# Patient Record
Sex: FEMALE | Race: BLACK OR AFRICAN AMERICAN | NOT HISPANIC OR LATINO | URBAN - METROPOLITAN AREA
[De-identification: names, ages, dates, MRNs, and addresses within clinical notes are randomized per-mention and may not be internally consistent; named-entity substitution may affect disease eponyms.]

---

## 2020-06-15 ENCOUNTER — OFFICE VISIT (OUTPATIENT)
Dept: URBAN - METROPOLITAN AREA TELEHEALTH 2 | Facility: TELEHEALTH | Age: 70
End: 2020-06-15
Payer: MEDICARE

## 2020-06-15 ENCOUNTER — LAB OUTSIDE AN ENCOUNTER (OUTPATIENT)
Dept: URBAN - METROPOLITAN AREA TELEHEALTH 2 | Facility: TELEHEALTH | Age: 70
End: 2020-06-15

## 2020-06-15 DIAGNOSIS — R19.7 DIARRHEA: ICD-10-CM

## 2020-06-15 DIAGNOSIS — B96.81 HELICOBACTER PYLORI INFECTION: ICD-10-CM

## 2020-06-15 DIAGNOSIS — R11.0 NAUSEA: ICD-10-CM

## 2020-06-15 DIAGNOSIS — K21.9 GERD (GASTROESOPHAGEAL REFLUX DISEASE): ICD-10-CM

## 2020-06-15 DIAGNOSIS — K92.1 RECTAL BLEEDING: ICD-10-CM

## 2020-06-15 DIAGNOSIS — K59.00 CONSTIPATION: ICD-10-CM

## 2020-06-15 DIAGNOSIS — K29.70 GASTRITIS: ICD-10-CM

## 2020-06-15 DIAGNOSIS — R05 COUGH: ICD-10-CM

## 2020-06-15 DIAGNOSIS — R10.13 EPIGASTRIC DISCOMFORT: ICD-10-CM

## 2020-06-15 PROCEDURE — 99443 PHONE E/M BY PHYS 21-30 MIN: CPT | Performed by: INTERNAL MEDICINE

## 2020-06-15 RX ORDER — SODIUM, POTASSIUM,MAG SULFATES 17.5-3.13G
17.5-13.3-1.6 GM/177ML SOLUTION, RECONSTITUTED, ORAL ORAL
Qty: 177 MILLILITER | Refills: 0 | OUTPATIENT
Start: 2020-06-15 | End: 2020-06-16

## 2020-06-15 RX ORDER — AMITRIPTYLINE HYDROCHLORIDE 10 MG/1
TAKE 1 TABLET BY MOUTH EVERYDAY AT BEDTIME TABLET, FILM COATED ORAL
Qty: 90 | Refills: 3 | Status: ACTIVE | COMMUNITY
Start: 2020-03-10 | End: 2021-03-05

## 2020-06-15 RX ORDER — LEUCOVORIN CALCIUM 10 MG/1
TAKE ONE DAILY TABLET ORAL
Qty: 0 | Refills: 0 | Status: ACTIVE | COMMUNITY
Start: 1900-01-01

## 2020-06-15 RX ORDER — LOSARTAN POTASSIUM AND HYDROCHLOROTHIAZIDE 100; 12.5 MG/1; MG/1
TAKE 1 TABLET BY ORAL ROUTE ONCE DAILY TABLET, FILM COATED ORAL 1
Qty: 0 | Refills: 0 | Status: ACTIVE | COMMUNITY
Start: 1900-01-01

## 2020-06-15 RX ORDER — LEVOTHYROXINE SODIUM 25 UG/1
TABLET ORAL
Qty: 0 | Refills: 0 | Status: ACTIVE | COMMUNITY
Start: 1900-01-01

## 2020-06-15 RX ORDER — PANTOPRAZOLE SODIUM 40 MG/1
TAKE 1 TABLET BY MOUTH EVERY DAY TABLET, DELAYED RELEASE ORAL
Qty: 90 | Refills: 3 | Status: ACTIVE | COMMUNITY
Start: 2020-03-09 | End: 2021-03-04

## 2020-06-15 RX ORDER — PREDNISONE 5 MG/1
TABLET ORAL
Qty: 0 | Refills: 0 | Status: ACTIVE | COMMUNITY
Start: 1900-01-01

## 2020-06-15 NOTE — HPI-TODAY'S VISIT:
Attempts were made to use real-time two-way video technology for today's encounter. Due to a technological failure or access issues, telephone technology was used in lieu of an office visit due to the current COVID-19 pandemic crisis and need for social isolation. Patient seen today via telehealth by agreement and consent of patient in light of current COVID-19 pandemic. I used a telephone call during the visit. The patient encounter is appropriate and reasonable under the circumstances given the patient's particular presentation at this time. The patient has been advised of the followin) the potential risks and limitations of this mode of treatment (including but not limited to the absence of in-person examination); 2) the right to refuse telehealth services at any point without affecting the right to future care; 3) the right to receive in-person services, included immediately after this consultation if an urgent need arises; 4) information, including identifiable images or information from this telehealth consult, will only be shared in accordance with HIPAA regulations. Any and all of the patient's and/or patient's family member's questions on this issue have been answered. The patient has verbally consented to be treated via telehealth services. The patient has also been advised to contact this office for worsening conditions or problems, and seek emergency medical treatment and/or call 911 if the patient deems either necessary. Patient presents with complaints of rectal bleeding for the last 2 weeks.  She states that this is bright red blood that is both mixed with the stool and noted on the tissue.  On rare occasion she may pass blood along.  She denies rectal pain, burning or itching.  She is not aware of hemorrhoids.  She states that she will develop crampy abdominal pain after meals that lead to loose watery stools.  There is blood mixed in with the stool.  If the cramping is severe enough she may just pass blood alone.  She has no nocturnal symptoms.  She has not lost any weight. She has had no evaluation for this issue.  There is no family history of colon cancer or colon polyps.  She is not on blood thinners. She had a negative colonoscopy in 2016.

## 2020-06-17 ENCOUNTER — TELEPHONE ENCOUNTER (OUTPATIENT)
Dept: URBAN - NONMETROPOLITAN AREA CLINIC 2 | Facility: CLINIC | Age: 70
End: 2020-06-17

## 2020-06-19 ENCOUNTER — OFFICE VISIT (OUTPATIENT)
Dept: URBAN - METROPOLITAN AREA TELEHEALTH 2 | Facility: TELEHEALTH | Age: 70
End: 2020-06-19

## 2020-06-25 LAB
HEMATOCRIT: 37.5
HEMOGLOBIN: 12.1
Lab: (no result)
MCH: 28.8
MCHC: 32.3
MCV: 89
NRBC: (no result)
PLATELETS: 232
RBC: 4.2
RDW: 13.3
WBC: 7

## 2020-06-29 ENCOUNTER — TELEPHONE ENCOUNTER (OUTPATIENT)
Dept: URBAN - NONMETROPOLITAN AREA CLINIC 2 | Facility: CLINIC | Age: 70
End: 2020-06-29

## 2020-06-29 RX ORDER — LEVOTHYROXINE SODIUM 25 UG/1
TABLET ORAL
Qty: 0 | Refills: 0 | Status: ACTIVE | COMMUNITY
Start: 1900-01-01

## 2020-06-29 RX ORDER — PANTOPRAZOLE SODIUM 40 MG/1
TAKE 1 TABLET BY MOUTH EVERY DAY TABLET, DELAYED RELEASE ORAL
Qty: 90 | Refills: 3 | Status: ACTIVE | COMMUNITY
Start: 2020-03-09 | End: 2021-03-04

## 2020-06-29 RX ORDER — AMITRIPTYLINE HYDROCHLORIDE 10 MG/1
TAKE 1 TABLET BY MOUTH EVERYDAY AT BEDTIME TABLET, FILM COATED ORAL
Qty: 90 | Refills: 3 | Status: ACTIVE | COMMUNITY
Start: 2020-03-10 | End: 2021-03-05

## 2020-06-29 RX ORDER — LOSARTAN POTASSIUM AND HYDROCHLOROTHIAZIDE 100; 12.5 MG/1; MG/1
TAKE 1 TABLET BY ORAL ROUTE ONCE DAILY TABLET, FILM COATED ORAL 1
Qty: 0 | Refills: 0 | Status: ACTIVE | COMMUNITY
Start: 1900-01-01

## 2020-06-29 RX ORDER — LEUCOVORIN CALCIUM 10 MG/1
TAKE ONE DAILY TABLET ORAL
Qty: 0 | Refills: 0 | Status: ACTIVE | COMMUNITY
Start: 1900-01-01

## 2020-06-29 RX ORDER — PREDNISONE 5 MG/1
TABLET ORAL
Qty: 0 | Refills: 0 | Status: ACTIVE | COMMUNITY
Start: 1900-01-01

## 2020-06-29 RX ORDER — SODIUM, POTASSIUM,MAG SULFATES 17.5-3.13G
177ML SOLUTION, RECONSTITUTED, ORAL ORAL
Qty: 1 KIT | Refills: 0 | OUTPATIENT

## 2020-07-15 ENCOUNTER — CLAIMS CREATED FROM THE CLAIM WINDOW (OUTPATIENT)
Dept: URBAN - METROPOLITAN AREA CLINIC 4 | Facility: CLINIC | Age: 70
End: 2020-07-15
Payer: MEDICARE

## 2020-07-15 ENCOUNTER — OFFICE VISIT (OUTPATIENT)
Dept: URBAN - NONMETROPOLITAN AREA SURGERY CENTER 1 | Facility: SURGERY CENTER | Age: 70
End: 2020-07-15
Payer: MEDICARE

## 2020-07-15 DIAGNOSIS — K52.9 NONINFECTIVE GASTROENTERITIS AND COLITIS, UNSPECIFIED: ICD-10-CM

## 2020-07-15 DIAGNOSIS — K52.89 (LYMPHOCYTIC) MICROSCOPIC COLITIS: ICD-10-CM

## 2020-07-15 DIAGNOSIS — K62.5 ANAL BLEEDING: ICD-10-CM

## 2020-07-15 PROCEDURE — G9937 DIG OR SURV COLSCO: HCPCS | Performed by: INTERNAL MEDICINE

## 2020-07-15 PROCEDURE — G8907 PT DOC NO EVENTS ON DISCHARG: HCPCS | Performed by: INTERNAL MEDICINE

## 2020-07-15 PROCEDURE — 88305 TISSUE EXAM BY PATHOLOGIST: CPT | Performed by: PATHOLOGY

## 2020-07-15 PROCEDURE — 45380 COLONOSCOPY AND BIOPSY: CPT | Performed by: INTERNAL MEDICINE

## 2020-07-15 RX ORDER — LEUCOVORIN CALCIUM 10 MG/1
TAKE ONE DAILY TABLET ORAL
Qty: 0 | Refills: 0 | Status: ACTIVE | COMMUNITY
Start: 1900-01-01

## 2020-07-15 RX ORDER — LEVOTHYROXINE SODIUM 25 UG/1
TABLET ORAL
Qty: 0 | Refills: 0 | Status: ACTIVE | COMMUNITY
Start: 1900-01-01

## 2020-07-15 RX ORDER — AMITRIPTYLINE HYDROCHLORIDE 10 MG/1
TAKE 1 TABLET BY MOUTH EVERYDAY AT BEDTIME TABLET, FILM COATED ORAL
Qty: 90 | Refills: 3 | Status: ACTIVE | COMMUNITY
Start: 2020-03-10 | End: 2021-03-05

## 2020-07-15 RX ORDER — PANTOPRAZOLE SODIUM 40 MG/1
TAKE 1 TABLET BY MOUTH EVERY DAY TABLET, DELAYED RELEASE ORAL
Qty: 90 | Refills: 3 | Status: ACTIVE | COMMUNITY
Start: 2020-03-09 | End: 2021-03-04

## 2020-07-15 RX ORDER — PREDNISONE 5 MG/1
TABLET ORAL
Qty: 0 | Refills: 0 | Status: ACTIVE | COMMUNITY
Start: 1900-01-01

## 2020-07-15 RX ORDER — LOSARTAN POTASSIUM AND HYDROCHLOROTHIAZIDE 100; 12.5 MG/1; MG/1
TAKE 1 TABLET BY ORAL ROUTE ONCE DAILY TABLET, FILM COATED ORAL 1
Qty: 0 | Refills: 0 | Status: ACTIVE | COMMUNITY
Start: 1900-01-01

## 2020-07-15 RX ORDER — SODIUM, POTASSIUM,MAG SULFATES 17.5-3.13G
177ML SOLUTION, RECONSTITUTED, ORAL ORAL
Qty: 1 KIT | Refills: 0 | Status: ACTIVE | COMMUNITY

## 2020-07-27 ENCOUNTER — TELEPHONE ENCOUNTER (OUTPATIENT)
Dept: URBAN - METROPOLITAN AREA CLINIC 92 | Facility: CLINIC | Age: 70
End: 2020-07-27

## 2020-07-27 ENCOUNTER — LAB OUTSIDE AN ENCOUNTER (OUTPATIENT)
Dept: URBAN - METROPOLITAN AREA CLINIC 92 | Facility: CLINIC | Age: 70
End: 2020-07-27

## 2020-07-27 RX ORDER — PREDNISONE 5 MG/1
TABLET ORAL
Qty: 0 | Refills: 0 | Status: ACTIVE | COMMUNITY
Start: 1900-01-01

## 2020-07-27 RX ORDER — METRONIDAZOLE 250 MG/1
1 TABLET TABLET, FILM COATED ORAL THREE TIMES A DAY
Qty: 30 TABLET | Refills: 5 | OUTPATIENT
Start: 2020-07-27 | End: 2020-09-25

## 2020-07-27 RX ORDER — LEVOTHYROXINE SODIUM 25 UG/1
TABLET ORAL
Qty: 0 | Refills: 0 | Status: ACTIVE | COMMUNITY
Start: 1900-01-01

## 2020-07-27 RX ORDER — AMITRIPTYLINE HYDROCHLORIDE 10 MG/1
TAKE 1 TABLET BY MOUTH EVERYDAY AT BEDTIME TABLET, FILM COATED ORAL
Qty: 90 | Refills: 3 | Status: ACTIVE | COMMUNITY
Start: 2020-03-10 | End: 2021-03-05

## 2020-07-27 RX ORDER — LOSARTAN POTASSIUM AND HYDROCHLOROTHIAZIDE 100; 12.5 MG/1; MG/1
TAKE 1 TABLET BY ORAL ROUTE ONCE DAILY TABLET, FILM COATED ORAL 1
Qty: 0 | Refills: 0 | Status: ACTIVE | COMMUNITY
Start: 1900-01-01

## 2020-07-27 RX ORDER — PANTOPRAZOLE SODIUM 40 MG/1
TAKE 1 TABLET BY MOUTH EVERY DAY TABLET, DELAYED RELEASE ORAL
Qty: 90 | Refills: 3 | Status: ACTIVE | COMMUNITY
Start: 2020-03-09 | End: 2021-03-04

## 2020-07-27 RX ORDER — LEUCOVORIN CALCIUM 10 MG/1
TAKE ONE DAILY TABLET ORAL
Qty: 0 | Refills: 0 | Status: ACTIVE | COMMUNITY
Start: 1900-01-01

## 2020-07-27 RX ORDER — SODIUM, POTASSIUM,MAG SULFATES 17.5-3.13G
177ML SOLUTION, RECONSTITUTED, ORAL ORAL
Qty: 1 KIT | Refills: 0 | Status: ACTIVE | COMMUNITY

## 2020-07-29 LAB
C-REACTIVE PROTEIN, QUANT: 2
HEMATOCRIT: 34.3
HEMOGLOBIN: 11.2
MCH: 28.8
MCHC: 32.7
MCV: 88
NRBC: (no result)
PLATELETS: 206
RBC: 3.89
RDW: 13.1
WBC: 5.7

## 2020-07-31 LAB — OCCULT BLOOD, FECAL, IA: POSITIVE

## 2020-08-07 ENCOUNTER — WEB ENCOUNTER (OUTPATIENT)
Dept: URBAN - NONMETROPOLITAN AREA CLINIC 2 | Facility: CLINIC | Age: 70
End: 2020-08-07

## 2020-08-07 ENCOUNTER — OFFICE VISIT (OUTPATIENT)
Dept: URBAN - NONMETROPOLITAN AREA CLINIC 2 | Facility: CLINIC | Age: 70
End: 2020-08-07
Payer: MEDICARE

## 2020-08-07 DIAGNOSIS — K29.70 GASTRITIS: ICD-10-CM

## 2020-08-07 DIAGNOSIS — K59.00 CONSTIPATION: ICD-10-CM

## 2020-08-07 DIAGNOSIS — K52.9 COLITIS: ICD-10-CM

## 2020-08-07 DIAGNOSIS — R10.13 EPIGASTRIC DISCOMFORT: ICD-10-CM

## 2020-08-07 DIAGNOSIS — B96.81 HELICOBACTER PYLORI INFECTION: ICD-10-CM

## 2020-08-07 DIAGNOSIS — R11.0 NAUSEA: ICD-10-CM

## 2020-08-07 DIAGNOSIS — R05 COUGH: ICD-10-CM

## 2020-08-07 DIAGNOSIS — K52.89 (LYMPHOCYTIC) MICROSCOPIC COLITIS: ICD-10-CM

## 2020-08-07 DIAGNOSIS — R19.7 DIARRHEA: ICD-10-CM

## 2020-08-07 DIAGNOSIS — K21.9 GERD (GASTROESOPHAGEAL REFLUX DISEASE): ICD-10-CM

## 2020-08-07 PROCEDURE — G8427 DOCREV CUR MEDS BY ELIG CLIN: HCPCS | Performed by: INTERNAL MEDICINE

## 2020-08-07 PROCEDURE — 99214 OFFICE O/P EST MOD 30 MIN: CPT | Performed by: INTERNAL MEDICINE

## 2020-08-07 PROCEDURE — 1036F TOBACCO NON-USER: CPT | Performed by: INTERNAL MEDICINE

## 2020-08-07 PROCEDURE — 3017F COLORECTAL CA SCREEN DOC REV: CPT | Performed by: INTERNAL MEDICINE

## 2020-08-07 PROCEDURE — G8417 CALC BMI ABV UP PARAM F/U: HCPCS | Performed by: INTERNAL MEDICINE

## 2020-08-07 PROCEDURE — G9903 PT SCRN TBCO ID AS NON USER: HCPCS | Performed by: INTERNAL MEDICINE

## 2020-08-07 RX ORDER — PREDNISONE 5 MG/1
TABLET ORAL
Qty: 0 | Refills: 0 | Status: ACTIVE | COMMUNITY
Start: 1900-01-01

## 2020-08-07 RX ORDER — SODIUM, POTASSIUM,MAG SULFATES 17.5-3.13G
177ML SOLUTION, RECONSTITUTED, ORAL ORAL
Qty: 1 KIT | Refills: 0 | Status: ACTIVE | COMMUNITY

## 2020-08-07 RX ORDER — PANTOPRAZOLE SODIUM 40 MG/1
TAKE 1 TABLET BY MOUTH EVERY DAY TABLET, DELAYED RELEASE ORAL
Qty: 90 | Refills: 3 | Status: ACTIVE | COMMUNITY
Start: 2020-03-09 | End: 2021-03-04

## 2020-08-07 RX ORDER — METRONIDAZOLE 250 MG/1
1 TABLET TABLET, FILM COATED ORAL THREE TIMES A DAY
Qty: 30 TABLET | Refills: 5 | Status: ACTIVE | COMMUNITY
Start: 2020-07-27 | End: 2020-09-25

## 2020-08-07 RX ORDER — SULFASALAZINE 500 MG/1
2 TABLETS TABLET ORAL TID
Qty: 180 TABLET | Refills: 3 | OUTPATIENT
Start: 2020-08-07 | End: 2020-12-04

## 2020-08-07 RX ORDER — LEVOTHYROXINE SODIUM 25 UG/1
TABLET ORAL
Qty: 0 | Refills: 0 | Status: ACTIVE | COMMUNITY
Start: 1900-01-01

## 2020-08-07 RX ORDER — LEUCOVORIN CALCIUM 10 MG/1
TAKE ONE DAILY TABLET ORAL
Qty: 0 | Refills: 0 | Status: ACTIVE | COMMUNITY
Start: 1900-01-01

## 2020-08-07 RX ORDER — AMITRIPTYLINE HYDROCHLORIDE 10 MG/1
TAKE 1 TABLET BY MOUTH EVERYDAY AT BEDTIME TABLET, FILM COATED ORAL
Qty: 90 | Refills: 3 | Status: ACTIVE | COMMUNITY
Start: 2020-03-10 | End: 2021-03-05

## 2020-08-07 RX ORDER — LOSARTAN POTASSIUM AND HYDROCHLOROTHIAZIDE 100; 12.5 MG/1; MG/1
TAKE 1 TABLET BY ORAL ROUTE ONCE DAILY TABLET, FILM COATED ORAL 1
Qty: 0 | Refills: 0 | Status: ACTIVE | COMMUNITY
Start: 1900-01-01

## 2020-08-07 NOTE — PHYSICAL EXAM GASTROINTESTINAL
Abdomen , soft, nontender, nondistended , no guarding or rigidity , no masses palpable , normal bowel sounds , Liver and Spleen , no hepatomegaly present , no hepatosplenomegaly , liver nontender , spleen not palpable , Self Exam: Abdomen soft, non-tender to palpatation, non-distended

## 2020-08-07 NOTE — HPI-TODAY'S VISIT:
Pt comes in for follow up of possible colitis. There was segmental erythema in the sigmoid and microscopic inflammation throughout the rest of the colon. She has normal BMs and no bleeding currently. Although she did have rectal bleeding that prompted the colonoscopy. She has no history of IBD.  She is currently on a Pred taper. Recent Hemosure is +, Hgb is down slightly but CRP is normal.

## 2020-08-07 NOTE — PHYSICAL EXAM CHEST:
no lesions,  no deformities,  no traumatic injuries,  no significant scars are present,  chest wall non-tender,  no masses present,  tactile fremitus is normal, breathing is unlabored without accessory muscle use, normal breath sounds , breathing is unlabored without accessory muscle use.

## 2020-08-07 NOTE — PHYSICAL EXAM NECK/THYROID:
normal appearance , without tenderness upon palpation , no deformities , trachea midline , Thyroid normal size , no thyroid nodules , no masses , no JVD , thyroid nontender , normal appearance , no deformities

## 2020-08-13 ENCOUNTER — TELEPHONE ENCOUNTER (OUTPATIENT)
Dept: URBAN - NONMETROPOLITAN AREA CLINIC 2 | Facility: CLINIC | Age: 70
End: 2020-08-13

## 2020-08-13 RX ORDER — BALSALAZIDE DISODIUM 750 MG/1
3 PO BID CAPSULE ORAL TWICE A DAY
Qty: 540 CAPSULES | Refills: 0 | OUTPATIENT

## 2020-08-13 RX ORDER — SULFASALAZINE 500 MG/1
2 TABLETS TABLET ORAL TID
OUTPATIENT
Start: 2020-08-07 | End: 2020-12-04

## 2020-08-17 ENCOUNTER — TELEPHONE ENCOUNTER (OUTPATIENT)
Dept: URBAN - NONMETROPOLITAN AREA CLINIC 1 | Facility: CLINIC | Age: 70
End: 2020-08-17

## 2020-08-23 ENCOUNTER — ERX REFILL RESPONSE (OUTPATIENT)
Dept: URBAN - METROPOLITAN AREA CLINIC 92 | Facility: CLINIC | Age: 70
End: 2020-08-23

## 2020-08-28 ENCOUNTER — ERX REFILL RESPONSE (OUTPATIENT)
Age: 70
End: 2020-08-28

## 2020-08-28 RX ORDER — PREDNISONE 20 MG/1
PLEASE SEE ATTACHED FOR DETAILED DIRECTIONS TABLET ORAL
Qty: 35 | Refills: 0

## 2020-09-22 ENCOUNTER — OFFICE VISIT (OUTPATIENT)
Dept: URBAN - NONMETROPOLITAN AREA CLINIC 2 | Facility: CLINIC | Age: 70
End: 2020-09-22
Payer: MEDICARE

## 2020-09-22 DIAGNOSIS — K52.9 COLITIS: ICD-10-CM

## 2020-09-22 PROCEDURE — G8427 DOCREV CUR MEDS BY ELIG CLIN: HCPCS | Performed by: NURSE PRACTITIONER

## 2020-09-22 PROCEDURE — 1036F TOBACCO NON-USER: CPT | Performed by: NURSE PRACTITIONER

## 2020-09-22 PROCEDURE — 3017F COLORECTAL CA SCREEN DOC REV: CPT | Performed by: NURSE PRACTITIONER

## 2020-09-22 PROCEDURE — 99213 OFFICE O/P EST LOW 20 MIN: CPT | Performed by: NURSE PRACTITIONER

## 2020-09-22 PROCEDURE — G8417 CALC BMI ABV UP PARAM F/U: HCPCS | Performed by: NURSE PRACTITIONER

## 2020-09-22 PROCEDURE — 82274 ASSAY TEST FOR BLOOD FECAL: CPT | Performed by: NURSE PRACTITIONER

## 2020-09-22 RX ORDER — AMITRIPTYLINE HYDROCHLORIDE 10 MG/1
TAKE 1 TABLET BY MOUTH EVERYDAY AT BEDTIME TABLET, FILM COATED ORAL
Qty: 90 | Refills: 3 | Status: ACTIVE | COMMUNITY
Start: 2020-03-10 | End: 2021-03-05

## 2020-09-22 RX ORDER — HYOSCYAMINE SULFATE 0.38 MG/1
1 TABLET TABLET ORAL
Status: ACTIVE | COMMUNITY

## 2020-09-22 RX ORDER — PANTOPRAZOLE SODIUM 40 MG/1
TAKE 1 TABLET BY MOUTH EVERY DAY TABLET, DELAYED RELEASE ORAL
Qty: 90 | Refills: 3 | Status: ACTIVE | COMMUNITY
Start: 2020-03-09 | End: 2021-03-04

## 2020-09-22 RX ORDER — LEVOTHYROXINE SODIUM 25 UG/1
TABLET ORAL
Qty: 0 | Refills: 0 | Status: ACTIVE | COMMUNITY
Start: 1900-01-01

## 2020-09-22 RX ORDER — LOSARTAN POTASSIUM AND HYDROCHLOROTHIAZIDE 100; 12.5 MG/1; MG/1
TAKE 1 TABLET BY ORAL ROUTE ONCE DAILY TABLET, FILM COATED ORAL 1
Qty: 0 | Refills: 0 | Status: ACTIVE | COMMUNITY
Start: 1900-01-01

## 2020-09-22 NOTE — HPI-TODAY'S VISIT:
Patient presents for follow-up for nonspecific colitis.  Noted be mildly anemic in July with hemoglobin of 11.2 and normal MCV.  She completed prednisone taper and is on Azulfidine 2 tablets twice daily.  She could not tolerate 3 tablets twice daily due to fatigue.  She has had no further rectal bleeding.  She continues to have 3-4 stools in the mornings and 1 in the afternoon/evening.  She continues with lower abdominal cramping. TG

## 2020-09-23 LAB
FERRITIN, SERUM: 261
HEMATOCRIT: 37.5
HEMOGLOBIN: 12.3
IRON: 87
MCH: 29.4
MCHC: 32.8
MCV: 90
NRBC: (no result)
PLATELETS: 199
RBC: 4.19
RDW: 13.4
WBC: 4.6

## 2020-10-05 ENCOUNTER — TELEPHONE ENCOUNTER (OUTPATIENT)
Dept: URBAN - METROPOLITAN AREA CLINIC 92 | Facility: CLINIC | Age: 70
End: 2020-10-05

## 2020-10-05 LAB
CALPROTECTIN, FECAL: 878
OCCULT BLOOD, FECAL, IA: POSITIVE

## 2020-10-05 RX ORDER — AMITRIPTYLINE HYDROCHLORIDE 10 MG/1
TAKE 1 TABLET BY MOUTH EVERYDAY AT BEDTIME TABLET, FILM COATED ORAL
Qty: 90 | Refills: 3 | Status: ACTIVE | COMMUNITY
Start: 2020-03-10 | End: 2021-03-05

## 2020-10-05 RX ORDER — PREDNISONE 10 MG/1
4 TABLETS X 7 DAYS, THEN 3 TABLETS X 7 DAYS, THEN 2 TABLETS X 7 DAYS, THEN 1 TABLET X 7 DAYS TABLET ORAL ONCE A DAY
Qty: 70 TABLETS | Refills: 0 | OUTPATIENT
Start: 2020-10-05 | End: 2020-11-01

## 2020-10-05 RX ORDER — HYOSCYAMINE SULFATE 0.38 MG/1
1 TABLET TABLET ORAL
Status: ACTIVE | COMMUNITY

## 2020-10-05 RX ORDER — LEVOTHYROXINE SODIUM 25 UG/1
TABLET ORAL
Qty: 0 | Refills: 0 | Status: ACTIVE | COMMUNITY
Start: 1900-01-01

## 2020-10-05 RX ORDER — PANTOPRAZOLE SODIUM 40 MG/1
TAKE 1 TABLET BY MOUTH EVERY DAY TABLET, DELAYED RELEASE ORAL
Qty: 90 | Refills: 3 | Status: ACTIVE | COMMUNITY
Start: 2020-03-09 | End: 2021-03-04

## 2020-10-05 RX ORDER — LOSARTAN POTASSIUM AND HYDROCHLOROTHIAZIDE 100; 12.5 MG/1; MG/1
TAKE 1 TABLET BY ORAL ROUTE ONCE DAILY TABLET, FILM COATED ORAL 1
Qty: 0 | Refills: 0 | Status: ACTIVE | COMMUNITY
Start: 1900-01-01

## 2020-11-03 ENCOUNTER — ERX REFILL RESPONSE (OUTPATIENT)
Age: 70
End: 2020-11-03

## 2020-11-03 RX ORDER — HYOSCYAMINE SULFATE 0.38 MG/1
TAKE 1 TABLET (0.375 MG) BY ORAL ROUTE EVERY 12 HOURS FOR 30 DAYS TABLET ORAL
Qty: 60 TABLETS | Refills: 5

## 2020-11-24 ENCOUNTER — OFFICE VISIT (OUTPATIENT)
Dept: URBAN - NONMETROPOLITAN AREA CLINIC 2 | Facility: CLINIC | Age: 70
End: 2020-11-24
Payer: MEDICARE

## 2020-11-24 DIAGNOSIS — K52.9 COLITIS: ICD-10-CM

## 2020-11-24 DIAGNOSIS — K51.90 EXACERBATION OF ULCERATIVE COLITIS WITHOUT COMPLICATION: ICD-10-CM

## 2020-11-24 PROCEDURE — 1036F TOBACCO NON-USER: CPT | Performed by: NURSE PRACTITIONER

## 2020-11-24 PROCEDURE — G8417 CALC BMI ABV UP PARAM F/U: HCPCS | Performed by: NURSE PRACTITIONER

## 2020-11-24 PROCEDURE — G8483 FLU IMM NO ADMIN DOC REA: HCPCS | Performed by: NURSE PRACTITIONER

## 2020-11-24 PROCEDURE — G8427 DOCREV CUR MEDS BY ELIG CLIN: HCPCS | Performed by: NURSE PRACTITIONER

## 2020-11-24 PROCEDURE — 3017F COLORECTAL CA SCREEN DOC REV: CPT | Performed by: NURSE PRACTITIONER

## 2020-11-24 PROCEDURE — 99213 OFFICE O/P EST LOW 20 MIN: CPT | Performed by: NURSE PRACTITIONER

## 2020-11-24 PROCEDURE — 82274 ASSAY TEST FOR BLOOD FECAL: CPT | Performed by: NURSE PRACTITIONER

## 2020-11-24 RX ORDER — HYOSCYAMINE SULFATE 0.38 MG/1
TAKE 1 TABLET (0.375 MG) BY ORAL ROUTE EVERY 12 HOURS FOR 30 DAYS TABLET ORAL
Qty: 60 TABLETS | Refills: 5 | Status: ACTIVE | COMMUNITY

## 2020-11-24 RX ORDER — PANTOPRAZOLE SODIUM 40 MG/1
TAKE 1 TABLET BY MOUTH EVERY DAY TABLET, DELAYED RELEASE ORAL
Qty: 90 | Refills: 3 | Status: ACTIVE | COMMUNITY
Start: 2020-03-09 | End: 2021-03-04

## 2020-11-24 RX ORDER — LEVOTHYROXINE SODIUM 25 UG/1
TABLET ORAL
Qty: 0 | Refills: 0 | Status: ACTIVE | COMMUNITY
Start: 1900-01-01

## 2020-11-24 RX ORDER — LOSARTAN POTASSIUM AND HYDROCHLOROTHIAZIDE 100; 12.5 MG/1; MG/1
TAKE 1 TABLET BY ORAL ROUTE ONCE DAILY TABLET, FILM COATED ORAL 1
Qty: 0 | Refills: 0 | Status: ACTIVE | COMMUNITY
Start: 1900-01-01

## 2020-11-24 RX ORDER — AMITRIPTYLINE HYDROCHLORIDE 10 MG/1
TAKE 1 TABLET BY MOUTH EVERYDAY AT BEDTIME TABLET, FILM COATED ORAL
Qty: 90 | Refills: 3 | Status: ACTIVE | COMMUNITY
Start: 2020-03-10 | End: 2021-03-05

## 2020-11-24 NOTE — HPI-TODAY'S VISIT:
9/22/20 Patient presents for follow-up for nonspecific colitis.  Noted be mildly anemic in July with hemoglobin of 11.2 and normal MCV.  She completed prednisone taper and is on Azulfidine 2 tablets twice daily.  She could not tolerate 3 tablets twice daily due to fatigue.  She has had no further rectal bleeding.  She continues to have 3-4 stools in the mornings and 1 in the afternoon/evening.  She continues with lower abdominal cramping. TG  11/24/20 Ms. Umm Espitia presents for follow up for nonspecific colitis, noted to have segmental colitis of the rectosigmoid on colonoscopy 7/15/20, path with focal active colitis that was nonspecific. She did improve with the prednisone taper over the summer but had recurrent diarrhea at her last follow up. Fecal melissa at that visit was elevaetd at 878 and stool was occult positive. We treated with another steroid taper and continued on maintenance therapy. Today she reports loose stoolsl have resolved. She did see a bit of blood last week  but this was on the toilet tissue and only with one stool. O/w she is improved. No abdominal pain, nausea, vomiting, weight loss, changes in appetite, rash, itching, visual changes, etc.  TG

## 2020-11-25 LAB
BASO (ABSOLUTE): 0
BASOS: 0
EOS (ABSOLUTE): 0
EOS: 1
HEMATOCRIT: 37
HEMATOLOGY COMMENTS:: (no result)
HEMOGLOBIN: 12.1
IMMATURE CELLS: (no result)
IMMATURE GRANS (ABS): 0
IMMATURE GRANULOCYTES: 0
LYMPHS (ABSOLUTE): 3
LYMPHS: 61
MCH: 28.9
MCHC: 32.7
MCV: 89
MONOCYTES(ABSOLUTE): 0.5
MONOCYTES: 10
NEUTROPHILS (ABSOLUTE): 1.4
NEUTROPHILS: 28
NRBC: (no result)
PLATELETS: 248
RBC: 4.18
RDW: 13.4
WBC: 5

## 2020-12-02 LAB
CALPROTECTIN, FECAL: 116
OCCULT BLOOD, FECAL, IA: NEGATIVE

## 2021-01-22 ENCOUNTER — OFFICE VISIT (OUTPATIENT)
Dept: URBAN - NONMETROPOLITAN AREA CLINIC 2 | Facility: CLINIC | Age: 71
End: 2021-01-22

## 2021-01-26 ENCOUNTER — OFFICE VISIT (OUTPATIENT)
Dept: URBAN - NONMETROPOLITAN AREA CLINIC 2 | Facility: CLINIC | Age: 71
End: 2021-01-26
Payer: MEDICARE

## 2021-01-26 DIAGNOSIS — K51.80 OTHER ULCERATIVE COLITIS WITHOUT COMPLICATION: ICD-10-CM

## 2021-01-26 DIAGNOSIS — Z12.11 COLON CANCER SCREENING: ICD-10-CM

## 2021-01-26 DIAGNOSIS — K21.9 GERD (GASTROESOPHAGEAL REFLUX DISEASE): ICD-10-CM

## 2021-01-26 PROCEDURE — 3017F COLORECTAL CA SCREEN DOC REV: CPT | Performed by: NURSE PRACTITIONER

## 2021-01-26 PROCEDURE — G8427 DOCREV CUR MEDS BY ELIG CLIN: HCPCS | Performed by: NURSE PRACTITIONER

## 2021-01-26 PROCEDURE — G8417 CALC BMI ABV UP PARAM F/U: HCPCS | Performed by: NURSE PRACTITIONER

## 2021-01-26 PROCEDURE — 1036F TOBACCO NON-USER: CPT | Performed by: NURSE PRACTITIONER

## 2021-01-26 PROCEDURE — 99213 OFFICE O/P EST LOW 20 MIN: CPT | Performed by: NURSE PRACTITIONER

## 2021-01-26 PROCEDURE — G8482 FLU IMMUNIZE ORDER/ADMIN: HCPCS | Performed by: NURSE PRACTITIONER

## 2021-01-26 RX ORDER — HYOSCYAMINE SULFATE 0.38 MG/1
TAKE 1 TABLET (0.375 MG) BY ORAL ROUTE EVERY 12 HOURS FOR 30 DAYS TABLET ORAL
Qty: 60 TABLETS | Refills: 5 | Status: ACTIVE | COMMUNITY

## 2021-01-26 RX ORDER — LEVOTHYROXINE SODIUM 25 UG/1
TABLET ORAL
Qty: 0 | Refills: 0 | Status: ACTIVE | COMMUNITY
Start: 1900-01-01

## 2021-01-26 RX ORDER — AMITRIPTYLINE HYDROCHLORIDE 10 MG/1
TAKE 1 TABLET BY MOUTH EVERYDAY AT BEDTIME TABLET, FILM COATED ORAL
Qty: 90 | Refills: 3 | Status: ACTIVE | COMMUNITY
Start: 2020-03-10 | End: 2021-03-05

## 2021-01-26 RX ORDER — PANTOPRAZOLE SODIUM 40 MG/1
TAKE 1 TABLET BY MOUTH EVERY DAY TABLET, DELAYED RELEASE ORAL
Qty: 90 | Refills: 3 | Status: ACTIVE | COMMUNITY
Start: 2020-03-09 | End: 2021-03-04

## 2021-01-26 RX ORDER — BALSALAZIDE DISODIUM 750 MG/1
3 PO BID CAPSULE ORAL TWICE A DAY
Qty: 540 CAPSULES | Refills: 0 | Status: ACTIVE | COMMUNITY

## 2021-01-26 RX ORDER — LOSARTAN POTASSIUM AND HYDROCHLOROTHIAZIDE 100; 12.5 MG/1; MG/1
TAKE 1 TABLET BY ORAL ROUTE ONCE DAILY TABLET, FILM COATED ORAL 1
Qty: 0 | Refills: 0 | Status: ACTIVE | COMMUNITY
Start: 1900-01-01

## 2021-01-26 NOTE — HPI-TODAY'S VISIT:
1/26/21  Umm Espitia presents for follow up for for presumed UC of the rectosigmoid. She was mildly anemic in July and colonoscopy showed nonspecific colitis. She completed prednisone taper and started azulfidine but had recurrent diarrhea at follow up and fecal calprotectin was elevated at 878 with occult positive stool. We treated with another steroid taper and to continue maintenance medication (balsalazide). At follow up fecal calprotectin was normal and anemia had resolved with heme negative stool and normal bowel movements. Today she continues to do well. She reports 1 stool most mornings but will have 2-3 loose stools a few days a week. Stools are better if she leaves the fiber off. She has also stopped the Levsin. Overall, she feels well. No EIM.  TG  Endoscopy Colonoscopy 7/2020 with congested mucosa in the recto-sigmoid colon, path with questionable colitis, nonspecific.  EGD 1/29/20 normal EGD; gastric bx negative for H. pylori, esophageal bx c/w lymphocytic esophagitis (reflux related). Colonoscopy 12/2016-normal colon; single ulcer in the distal ileum with bx focally ulcerated TI, no granulomas.

## 2021-01-27 LAB
A/G RATIO: 1.4
ALBUMIN: 4.3
ALKALINE PHOSPHATASE: 48
ALT (SGPT): 18
AST (SGOT): 21
BASO (ABSOLUTE): 0
BASOS: 0
BILIRUBIN, TOTAL: 0.2
BUN/CREATININE RATIO: 15
BUN: 13
CALCIUM: 9.7
CARBON DIOXIDE, TOTAL: 27
CHLORIDE: 101
CREATININE: 0.88
EGFR IF AFRICN AM: 76
EGFR IF NONAFRICN AM: 66
EOS (ABSOLUTE): 0
EOS: 0
GLOBULIN, TOTAL: 3
GLUCOSE: 105
HEMATOCRIT: 41.8
HEMATOLOGY COMMENTS:: (no result)
HEMOGLOBIN: 13.5
IMMATURE CELLS: (no result)
IMMATURE GRANS (ABS): 0
IMMATURE GRANULOCYTES: 0
LYMPHS (ABSOLUTE): 2.1
LYMPHS: 44
MCH: 29.5
MCHC: 32.3
MCV: 92
MONOCYTES(ABSOLUTE): 0.3
MONOCYTES: 7
NEUTROPHILS (ABSOLUTE): 2.3
NEUTROPHILS: 49
NRBC: (no result)
PLATELETS: 229
POTASSIUM: 4.2
PROTEIN, TOTAL: 7.3
RBC: 4.57
RDW: 13.2
SODIUM: 142
WBC: 4.7

## 2021-01-29 LAB
FATS, NEUTRAL: NORMAL
FATS, TOTAL: NORMAL
OCCULT BLOOD, FECAL, IA: POSITIVE

## 2021-04-06 ENCOUNTER — TELEPHONE ENCOUNTER (OUTPATIENT)
Dept: URBAN - NONMETROPOLITAN AREA CLINIC 1 | Facility: CLINIC | Age: 71
End: 2021-04-06

## 2021-04-27 ENCOUNTER — ERX REFILL RESPONSE (OUTPATIENT)
Dept: URBAN - NONMETROPOLITAN AREA CLINIC 2 | Facility: CLINIC | Age: 71
End: 2021-04-27

## 2021-04-27 RX ORDER — AMITRIPTYLINE HYDROCHLORIDE 10 MG/1
TAKE 1 TABLET BY MOUTH EVERYDAY AT BEDTIME TABLET, FILM COATED ORAL
Qty: 90 TABLETS | Refills: 3

## 2021-07-06 ENCOUNTER — OFFICE VISIT (OUTPATIENT)
Dept: URBAN - NONMETROPOLITAN AREA CLINIC 2 | Facility: CLINIC | Age: 71
End: 2021-07-06
Payer: MEDICARE

## 2021-07-06 DIAGNOSIS — K21.9 GERD (GASTROESOPHAGEAL REFLUX DISEASE): ICD-10-CM

## 2021-07-06 DIAGNOSIS — Z12.11 COLON CANCER SCREENING: ICD-10-CM

## 2021-07-06 DIAGNOSIS — K51.80 OTHER ULCERATIVE COLITIS WITHOUT COMPLICATION: ICD-10-CM

## 2021-07-06 PROCEDURE — 99213 OFFICE O/P EST LOW 20 MIN: CPT | Performed by: INTERNAL MEDICINE

## 2021-07-06 RX ORDER — KRILL/OM-3/DHA/EPA/PHOSPHO/AST 1000-230MG
1 TABLET CAPSULE ORAL ONCE A DAY
Status: ACTIVE | COMMUNITY

## 2021-07-06 RX ORDER — AMITRIPTYLINE HYDROCHLORIDE 10 MG/1
TAKE 1 TABLET BY MOUTH EVERYDAY AT BEDTIME TABLET, FILM COATED ORAL
Qty: 90 TABLETS | Refills: 3 | Status: ON HOLD | COMMUNITY

## 2021-07-06 RX ORDER — HYOSCYAMINE SULFATE 0.38 MG/1
TAKE 1 TABLET (0.375 MG) BY ORAL ROUTE EVERY 12 HOURS FOR 30 DAYS TABLET ORAL
Qty: 60 TABLETS | Refills: 5 | Status: ON HOLD | COMMUNITY

## 2021-07-06 RX ORDER — LOSARTAN POTASSIUM AND HYDROCHLOROTHIAZIDE 100; 12.5 MG/1; MG/1
TAKE 1 TABLET BY ORAL ROUTE ONCE DAILY TABLET, FILM COATED ORAL 1
Qty: 0 | Refills: 0 | Status: ACTIVE | COMMUNITY
Start: 1900-01-01

## 2021-07-06 RX ORDER — BALSALAZIDE DISODIUM 750 MG/1
2 PO BID CAPSULE ORAL TWICE A DAY
Refills: 0 | Status: ACTIVE | COMMUNITY

## 2021-07-06 RX ORDER — LEVOTHYROXINE SODIUM 25 UG/1
TABLET ORAL
Qty: 0 | Refills: 0 | Status: ACTIVE | COMMUNITY
Start: 1900-01-01

## 2021-07-06 RX ORDER — BALSALAZIDE DISODIUM 750 MG/1
2 PO BID CAPSULE ORAL TWICE A DAY
Qty: 360 CAPSULES | Refills: 3

## 2021-07-06 NOTE — HPI-TODAY'S VISIT:
1/26/21  Umm Espiita presents for follow up for for presumed UC of the rectosigmoid. She was mildly anemic in July and colonoscopy showed nonspecific colitis. She completed prednisone taper and started azulfidine but had recurrent diarrhea at follow up and fecal calprotectin was elevated at 878 with occult positive stool. We treated with another steroid taper and to continue maintenance medication (balsalazide). At follow up fecal calprotectin was normal and anemia had resolved with heme negative stool and normal bowel movements. Today she continues to do well. She reports 1 stool most mornings but will have 2-3 loose stools a few days a week. Stools are better if she leaves the fiber off. She has also stopped the Levsin. Overall, she feels well. No EIM.  TG  Endoscopy Colonoscopy 7/2020 with congested mucosa in the recto-sigmoid colon, path with questionable colitis, nonspecific.  EGD 1/29/20 normal EGD; gastric bx negative for H. pylori, esophageal bx c/w lymphocytic esophagitis (reflux related). Colonoscopy 12/2016-normal colon; single ulcer in the distal ileum with bx focally ulcerated TI, no granulomas.  7/6/21 Ms. Salomon presents for UC follow up. She continues on balsalazide two capsules twice daily. Sx are well controlled unless she eats lactose which causes diarrhea and occasionally some brb in the loose stools. She does not see blood in formed stools. She rarely has some mild lower abdominal cramping, also associated with lactose. She had labs with PCP two weeks ago with normal results. No anemia. Hgb was 13.5 in January. Overall she is doing well and has no acute complaints today. TG

## 2021-07-08 ENCOUNTER — TELEPHONE ENCOUNTER (OUTPATIENT)
Dept: URBAN - NONMETROPOLITAN AREA CLINIC 1 | Facility: CLINIC | Age: 71
End: 2021-07-08

## 2021-10-06 ENCOUNTER — TELEPHONE ENCOUNTER (OUTPATIENT)
Dept: URBAN - NONMETROPOLITAN AREA CLINIC 2 | Facility: CLINIC | Age: 71
End: 2021-10-06

## 2021-10-06 RX ORDER — METRONIDAZOLE 250 MG/1
1 TABLET TABLET ORAL THREE TIMES A DAY
Qty: 21 TABLET | Refills: 0 | OUTPATIENT
Start: 2021-10-11 | End: 2021-10-18

## 2022-01-11 ENCOUNTER — WEB ENCOUNTER (OUTPATIENT)
Dept: URBAN - NONMETROPOLITAN AREA CLINIC 2 | Facility: CLINIC | Age: 72
End: 2022-01-11

## 2022-01-11 ENCOUNTER — OFFICE VISIT (OUTPATIENT)
Dept: URBAN - NONMETROPOLITAN AREA CLINIC 2 | Facility: CLINIC | Age: 72
End: 2022-01-11
Payer: MEDICARE

## 2022-01-11 DIAGNOSIS — R19.7 DIARRHEA, UNSPECIFIED TYPE: ICD-10-CM

## 2022-01-11 DIAGNOSIS — Z12.11 COLON CANCER SCREENING: ICD-10-CM

## 2022-01-11 DIAGNOSIS — K51.80 OTHER ULCERATIVE COLITIS WITHOUT COMPLICATION: ICD-10-CM

## 2022-01-11 DIAGNOSIS — M06.9 RHEUMATOID ARTHRITIS INVOLVING BOTH HANDS, UNSPECIFIED WHETHER RHEUMATOID FACTOR PRESENT: ICD-10-CM

## 2022-01-11 DIAGNOSIS — K21.9 GERD (GASTROESOPHAGEAL REFLUX DISEASE): ICD-10-CM

## 2022-01-11 PROCEDURE — 99214 OFFICE O/P EST MOD 30 MIN: CPT | Performed by: NURSE PRACTITIONER

## 2022-01-11 RX ORDER — LEVOTHYROXINE SODIUM 25 UG/1
TABLET ORAL
Qty: 0 | Refills: 0 | Status: ACTIVE | COMMUNITY
Start: 1900-01-01

## 2022-01-11 RX ORDER — METRONIDAZOLE 500 MG/1
1 TABLET TABLET ORAL THREE TIMES A DAY
Qty: 30 TABLETS | Refills: 0 | OUTPATIENT
Start: 2022-01-11 | End: 2022-01-21

## 2022-01-11 RX ORDER — ADALIMUMAB 40MG/0.4ML
0.4 ML KIT SUBCUTANEOUS EVERY OTHER WEEK
Status: ACTIVE | COMMUNITY

## 2022-01-11 RX ORDER — PREDNISONE 5 MG/1
1 TABLET TABLET ORAL ONCE A DAY
Status: ACTIVE | COMMUNITY

## 2022-01-11 RX ORDER — LOSARTAN POTASSIUM AND HYDROCHLOROTHIAZIDE 100; 12.5 MG/1; MG/1
TAKE 1 TABLET BY ORAL ROUTE ONCE DAILY TABLET, FILM COATED ORAL 1
Qty: 0 | Refills: 0 | Status: ACTIVE | COMMUNITY
Start: 1900-01-01

## 2022-01-11 RX ORDER — AMITRIPTYLINE HYDROCHLORIDE 10 MG/1
TAKE 1 TABLET BY MOUTH EVERYDAY AT BEDTIME TABLET, FILM COATED ORAL
Qty: 90 TABLETS | Refills: 3 | Status: ON HOLD | COMMUNITY

## 2022-01-11 RX ORDER — HYOSCYAMINE SULFATE 0.38 MG/1
TAKE 1 TABLET (0.375 MG) BY ORAL ROUTE EVERY 12 HOURS FOR 30 DAYS TABLET ORAL
Qty: 60 TABLETS | Refills: 5 | Status: ON HOLD | COMMUNITY

## 2022-01-11 RX ORDER — KRILL/OM-3/DHA/EPA/PHOSPHO/AST 1000-230MG
1 TABLET CAPSULE ORAL ONCE A DAY
Status: ACTIVE | COMMUNITY

## 2022-01-11 RX ORDER — COLCHICINE 0.6 MG/1
1 CAPSULE CAPSULE ORAL
Status: ACTIVE | COMMUNITY

## 2022-01-11 RX ORDER — BALSALAZIDE DISODIUM 750 MG/1
2 PO BID CAPSULE ORAL TWICE A DAY
Qty: 360 CAPSULES | Refills: 3 | Status: ACTIVE | COMMUNITY

## 2022-01-11 RX ORDER — PANTOPRAZOLE SODIUM 40 MG/1
1 TABLET TABLET, DELAYED RELEASE ORAL ONCE A DAY
Status: ACTIVE | COMMUNITY

## 2022-01-11 NOTE — HPI-TODAY'S VISIT:
1/26/21 Umm Espitia presents for follow up for for presumed UC of the rectosigmoid. She was mildly anemic in July and colonoscopy showed nonspecific colitis. She completed prednisone taper and started azulfidine but had recurrent diarrhea at follow up and fecal calprotectin was elevated at 878 with occult positive stool. We treated with another steroid taper and to continue maintenance medication (balsalazide). At follow up fecal calprotectin was normal and anemia had resolved with heme negative stool and normal bowel movements. Today she continues to do well. She reports 1 stool most mornings but will have 2-3 loose stools a few days a week. Stools are better if she leaves the fiber off. She has also stopped the Levsin. Overall, she feels well. No EIM.  TG  7/6/21 Ms. Salomon presents for UC follow up. She continues on balsalazide two capsules twice daily. Sx are well controlled unless she eats lactose which causes diarrhea and occasionally some brb in the loose stools. She does not see blood in formed stools. She rarely has some mild lower abdominal cramping, also associated with lactose. She had labs with PCP two weeks ago with normal results. No anemia. Hgb was 13.5 in January. Overall she is doing well and has no acute complaints today. TG  1/11/2022 Ms Salomon is a 70 YO F with UC who presents for follow up. Today, she reports ongoing diarrhea with 4-5 watery urgent stools most days. This has been ongoing for months. She is concerned it may be related to her balsalazide and/or Mitigare. At her last appiontment, her bowel habits were relatively normal on the balsalazide. Mitigare can cause diarrhea. Discussed this could also be related to her ulcerative colitis although in the past she has bleeding with flares and usually she does not present with diarrhea. She does take Imodium for diarrhea and this slows the urgency down but the watery stools continue. She continues on balsalazide two tablets twice daily. She is also now on Humira 40mg every other week prescribed by Dr. Sarkar for RA. She continues on prednisone 5mg and methotrexate for her RA. Her AMT was stopped due to tachycardia and heart palpitations in October. She has not had these symptoms since stopping the AMT. Unfortunately, she does have more stomach upset, borborgymi, and bloating since stopping it. She has taken her left over pills a few night when her symptoms are severe and felt better but did not continue it. She did not get the metronidazole Rx that was sent in October when she called with these symptoms.  Her heartburn is well controlled on pantoprazole 40mg once daily. TG  Endoscopy Colonoscopy 7/2020 with congested mucosa in the recto-sigmoid colon, path with questionable colitis, nonspecific.  EGD 1/29/20 normal EGD; gastric bx negative for H. pylori, esophageal bx c/w lymphocytic esophagitis (reflux related). Colonoscopy 12/2016-normal colon; single ulcer in the distal ileum with bx focally ulcerated TI, no granulomas.

## 2022-01-12 ENCOUNTER — TELEPHONE ENCOUNTER (OUTPATIENT)
Dept: URBAN - METROPOLITAN AREA CLINIC 92 | Facility: CLINIC | Age: 72
End: 2022-01-12

## 2022-01-12 LAB
A/G RATIO: 1.5
ALBUMIN: 4.3
ALKALINE PHOSPHATASE: 45
ALT (SGPT): 19
AST (SGOT): 20
BILIRUBIN, TOTAL: 0.3
BUN/CREATININE RATIO: 13
BUN: 13
C-REACTIVE PROTEIN, QUANT: 3
CALCIUM: 9.3
CARBON DIOXIDE, TOTAL: 23
CHLORIDE: 104
CREATININE: 1
EGFR IF AFRICN AM: 65
EGFR IF NONAFRICN AM: 56
GLOBULIN, TOTAL: 2.9
GLUCOSE: 83
HEMATOCRIT: 36.1
HEMOGLOBIN: 12.1
MCH: 30.6
MCHC: 33.5
MCV: 91
NRBC: (no result)
PLATELETS: 175
POTASSIUM: 3.5
PROTEIN, TOTAL: 7.2
RBC: 3.96
RDW: 13.6
SEDIMENTATION RATE-WESTERGREN: 45
SODIUM: 143
WBC: 5.1

## 2022-01-13 ENCOUNTER — TELEPHONE ENCOUNTER (OUTPATIENT)
Dept: URBAN - NONMETROPOLITAN AREA CLINIC 2 | Facility: CLINIC | Age: 72
End: 2022-01-13

## 2022-01-13 NOTE — HPI-TODAY'S VISIT:
1/31/2022 Discussed with Dr. Sarkar. He will have her hold the colchicine (Mitgare) to see if her diarrhea improves. We discussed that her Enbrel was switched to Humira last year to help with gut coverage. He reports he has continued on prednisone for UC coverage as well. However, her GI symptoms have been well controlled until she presented with diarrhea recently. Discussed that she can titrate off of the prednisone and will monitor symptoms.

## 2022-01-18 LAB — CALPROTECTIN, FECAL: 399

## 2022-01-21 ENCOUNTER — TELEPHONE ENCOUNTER (OUTPATIENT)
Dept: URBAN - METROPOLITAN AREA CLINIC 92 | Facility: CLINIC | Age: 72
End: 2022-01-21

## 2022-01-21 RX ORDER — PREDNISONE 5 MG/1
1 TABLET TABLET ORAL ONCE A DAY
Status: ACTIVE | COMMUNITY

## 2022-01-21 RX ORDER — LEVOTHYROXINE SODIUM 25 UG/1
TABLET ORAL
Qty: 0 | Refills: 0 | Status: ACTIVE | COMMUNITY
Start: 1900-01-01

## 2022-01-21 RX ORDER — METRONIDAZOLE 500 MG/1
1 TABLET TABLET ORAL THREE TIMES A DAY
Qty: 30 TABLETS | Refills: 0 | Status: ACTIVE | COMMUNITY
Start: 2022-01-11 | End: 2022-01-21

## 2022-01-21 RX ORDER — COLCHICINE 0.6 MG/1
1 CAPSULE CAPSULE ORAL
Status: ACTIVE | COMMUNITY

## 2022-01-21 RX ORDER — PANTOPRAZOLE SODIUM 40 MG/1
1 TABLET TABLET, DELAYED RELEASE ORAL ONCE A DAY
Status: ACTIVE | COMMUNITY

## 2022-01-21 RX ORDER — KRILL/OM-3/DHA/EPA/PHOSPHO/AST 1000-230MG
1 TABLET CAPSULE ORAL ONCE A DAY
Status: ACTIVE | COMMUNITY

## 2022-01-21 RX ORDER — ADALIMUMAB 40MG/0.4ML
0.4 ML KIT SUBCUTANEOUS EVERY OTHER WEEK
Status: ACTIVE | COMMUNITY

## 2022-01-21 RX ORDER — SODIUM, POTASSIUM,MAG SULFATES 17.5-3.13G
354ML SOLUTION, RECONSTITUTED, ORAL ORAL
Qty: 354 MILLILITER | Refills: 0 | OUTPATIENT
Start: 2022-01-24 | End: 2022-01-25

## 2022-01-21 RX ORDER — BALSALAZIDE DISODIUM 750 MG/1
2 PO BID CAPSULE ORAL TWICE A DAY
Qty: 360 CAPSULES | Refills: 3 | Status: ACTIVE | COMMUNITY

## 2022-01-21 RX ORDER — LOSARTAN POTASSIUM AND HYDROCHLOROTHIAZIDE 100; 12.5 MG/1; MG/1
TAKE 1 TABLET BY ORAL ROUTE ONCE DAILY TABLET, FILM COATED ORAL 1
Qty: 0 | Refills: 0 | Status: ACTIVE | COMMUNITY
Start: 1900-01-01

## 2022-01-21 RX ORDER — AMITRIPTYLINE HYDROCHLORIDE 10 MG/1
TAKE 1 TABLET BY MOUTH EVERYDAY AT BEDTIME TABLET, FILM COATED ORAL
Qty: 90 TABLETS | Refills: 3 | Status: ON HOLD | COMMUNITY

## 2022-01-21 RX ORDER — HYOSCYAMINE SULFATE 0.38 MG/1
TAKE 1 TABLET (0.375 MG) BY ORAL ROUTE EVERY 12 HOURS FOR 30 DAYS TABLET ORAL
Qty: 60 TABLETS | Refills: 5 | Status: ON HOLD | COMMUNITY

## 2022-01-24 ENCOUNTER — LAB OUTSIDE AN ENCOUNTER (OUTPATIENT)
Dept: URBAN - METROPOLITAN AREA CLINIC 92 | Facility: CLINIC | Age: 72
End: 2022-01-24

## 2022-01-26 ENCOUNTER — OFFICE VISIT (OUTPATIENT)
Dept: URBAN - NONMETROPOLITAN AREA SURGERY CENTER 1 | Facility: SURGERY CENTER | Age: 72
End: 2022-01-26
Payer: MEDICARE

## 2022-01-26 ENCOUNTER — CLAIMS CREATED FROM THE CLAIM WINDOW (OUTPATIENT)
Dept: URBAN - METROPOLITAN AREA CLINIC 4 | Facility: CLINIC | Age: 72
End: 2022-01-26
Payer: MEDICARE

## 2022-01-26 DIAGNOSIS — K63.89 PNEUMATOSIS OF INTESTINES: ICD-10-CM

## 2022-01-26 DIAGNOSIS — R19.7 ACUTE DIARRHEA: ICD-10-CM

## 2022-01-26 PROCEDURE — G8907 PT DOC NO EVENTS ON DISCHARG: HCPCS | Performed by: INTERNAL MEDICINE

## 2022-01-26 PROCEDURE — 88305 TISSUE EXAM BY PATHOLOGIST: CPT | Performed by: PATHOLOGY

## 2022-01-26 PROCEDURE — 45380 COLONOSCOPY AND BIOPSY: CPT | Performed by: INTERNAL MEDICINE

## 2022-01-28 LAB
ADALIMUMAB DRUG LEVEL: <0.6
ANTI-ADALIMUMAB ANTIBODY: 544

## 2022-02-01 ENCOUNTER — TELEPHONE ENCOUNTER (OUTPATIENT)
Dept: URBAN - METROPOLITAN AREA CLINIC 92 | Facility: CLINIC | Age: 72
End: 2022-02-01

## 2022-02-22 ENCOUNTER — OFFICE VISIT (OUTPATIENT)
Dept: URBAN - NONMETROPOLITAN AREA CLINIC 2 | Facility: CLINIC | Age: 72
End: 2022-02-22
Payer: MEDICARE

## 2022-02-22 DIAGNOSIS — K21.9 GERD (GASTROESOPHAGEAL REFLUX DISEASE): ICD-10-CM

## 2022-02-22 DIAGNOSIS — Z12.11 COLON CANCER SCREENING: ICD-10-CM

## 2022-02-22 DIAGNOSIS — M06.9 RHEUMATOID ARTHRITIS INVOLVING BOTH HANDS, UNSPECIFIED WHETHER RHEUMATOID FACTOR PRESENT: ICD-10-CM

## 2022-02-22 DIAGNOSIS — K51.80 OTHER ULCERATIVE COLITIS WITHOUT COMPLICATION: ICD-10-CM

## 2022-02-22 DIAGNOSIS — R19.7 DIARRHEA, UNSPECIFIED TYPE: ICD-10-CM

## 2022-02-22 DIAGNOSIS — K58.0 IRRITABLE BOWEL SYNDROME WITH DIARRHEA: ICD-10-CM

## 2022-02-22 PROBLEM — 62315008: Status: ACTIVE | Noted: 2022-02-22

## 2022-02-22 PROBLEM — 197125005: Status: ACTIVE | Noted: 2022-02-22

## 2022-02-22 PROBLEM — 64766004: Status: ACTIVE | Noted: 2021-01-27

## 2022-02-22 PROBLEM — 15687321000119109: Status: ACTIVE | Noted: 2022-01-11

## 2022-02-22 PROCEDURE — 99214 OFFICE O/P EST MOD 30 MIN: CPT | Performed by: NURSE PRACTITIONER

## 2022-02-22 RX ORDER — ADALIMUMAB 40MG/0.4ML
0.4 ML KIT SUBCUTANEOUS EVERY OTHER WEEK
Status: ON HOLD | COMMUNITY

## 2022-02-22 RX ORDER — LEVOTHYROXINE SODIUM 25 UG/1
TABLET ORAL
Qty: 0 | Refills: 0 | Status: ACTIVE | COMMUNITY
Start: 1900-01-01

## 2022-02-22 RX ORDER — AMITRIPTYLINE HYDROCHLORIDE 10 MG/1
TAKE 1 TABLET BY MOUTH EVERYDAY AT BEDTIME TABLET, FILM COATED ORAL
Qty: 90 TABLETS | Refills: 3 | Status: ON HOLD | COMMUNITY

## 2022-02-22 RX ORDER — COLCHICINE 0.6 MG/1
1 CAPSULE CAPSULE ORAL
Status: ACTIVE | COMMUNITY

## 2022-02-22 RX ORDER — KRILL/OM-3/DHA/EPA/PHOSPHO/AST 1000-230MG
1 TABLET CAPSULE ORAL ONCE A DAY
Status: ACTIVE | COMMUNITY

## 2022-02-22 RX ORDER — LOSARTAN POTASSIUM AND HYDROCHLOROTHIAZIDE 100; 12.5 MG/1; MG/1
TAKE 1 TABLET BY ORAL ROUTE ONCE DAILY TABLET, FILM COATED ORAL 1
Qty: 0 | Refills: 0 | Status: ACTIVE | COMMUNITY
Start: 1900-01-01

## 2022-02-22 RX ORDER — RIFAXIMIN 550 MG/1
1 TABLET TABLET ORAL THREE TIMES A DAY
Qty: 42 TABLET | Refills: 2 | OUTPATIENT
Start: 2022-02-22 | End: 2022-04-05

## 2022-02-22 RX ORDER — PANTOPRAZOLE SODIUM 40 MG/1
1 TABLET TABLET, DELAYED RELEASE ORAL ONCE A DAY
Status: ON HOLD | COMMUNITY

## 2022-02-22 RX ORDER — PREDNISONE 5 MG/1
1 TABLET TABLET ORAL ONCE A DAY
Status: ACTIVE | COMMUNITY

## 2022-02-22 RX ORDER — BALSALAZIDE DISODIUM 750 MG/1
2 PO BID CAPSULE ORAL TWICE A DAY
Qty: 360 CAPSULES | Refills: 3 | Status: ON HOLD | COMMUNITY

## 2022-02-22 RX ORDER — HYOSCYAMINE SULFATE 0.38 MG/1
TAKE 1 TABLET (0.375 MG) BY ORAL ROUTE EVERY 12 HOURS FOR 30 DAYS TABLET ORAL
Qty: 60 TABLETS | Refills: 5 | Status: ON HOLD | COMMUNITY

## 2022-02-22 NOTE — HPI-TODAY'S VISIT:
2/22/2022 Ms. Umm Espitia is a very pleasant 71-year-old female with RA and UC who presents for follow-up.  Since her last visit, fecal calprotectin came back elevated at 399 and ESR was slightly elevated at 45.  Her Humira drug level was undetectable and antibody was elevated.  She stopped the Humira as well as the balsalazide as it was making no difference in her diarrhea.  She did stop Mitigare with no improvement in diarrhea.  She had colonoscopy 1/26/2022 with normal-appearing colon.  Random biopsies  with no histopathologic changes. Since stopping the Humira, her fatigue has improved.   She continues to struggle with diarrhea.  She has a slightly formed stool first thing in the morning followed by multiple loose and at times watery stools throughout the day.  She has urgency after eating.  She has tried lactose-free diet, fiber, amitriptyline, Levsin, dicyclomine, Flagyl with improvement in her diarrhea.  She continued with diarrhea while on Humira, balsalazide, and azulfidine previously. She continues on prednisone 5 mg daily and methotrexate for her RA and follows with Dr. Sarkar.  They are currently in the process of trying to get Cimzia approved. TG

## 2022-02-28 ENCOUNTER — TELEPHONE ENCOUNTER (OUTPATIENT)
Dept: URBAN - NONMETROPOLITAN AREA CLINIC 2 | Facility: CLINIC | Age: 72
End: 2022-02-28

## 2022-03-07 LAB
C DIFFICILE TOXIN GENE NAA: NEGATIVE
C DIFFICILE TOXINS A+B, EIA: NEGATIVE
CAMPYLOBACTER CULTURE: (no result)
E COLI SHIGA TOXIN EIA: NEGATIVE
Lab: (no result)
OVA + PARASITE EXAM: (no result)
PANCREATIC ELASTASE, FECAL: >500
SALMONELLA/SHIGELLA SCREEN: (no result)
WHITE BLOOD CELLS (WBC), STOOL: (no result)

## 2022-05-24 ENCOUNTER — OFFICE VISIT (OUTPATIENT)
Dept: URBAN - NONMETROPOLITAN AREA CLINIC 13 | Facility: CLINIC | Age: 72
End: 2022-05-24

## 2022-05-31 ENCOUNTER — OFFICE VISIT (OUTPATIENT)
Dept: URBAN - NONMETROPOLITAN AREA CLINIC 2 | Facility: CLINIC | Age: 72
End: 2022-05-31
Payer: MEDICARE

## 2022-05-31 VITALS
TEMPERATURE: 97.7 F | WEIGHT: 203 LBS | DIASTOLIC BLOOD PRESSURE: 87 MMHG | BODY MASS INDEX: 32.62 KG/M2 | HEART RATE: 99 BPM | SYSTOLIC BLOOD PRESSURE: 144 MMHG | HEIGHT: 66 IN

## 2022-05-31 DIAGNOSIS — R19.5 LOOSE STOOLS: ICD-10-CM

## 2022-05-31 DIAGNOSIS — Z12.11 COLON CANCER SCREENING: ICD-10-CM

## 2022-05-31 DIAGNOSIS — K51.30 ULCERATIVE RECTOSIGMOIDITIS WITHOUT COMPLICATION: ICD-10-CM

## 2022-05-31 PROCEDURE — 99213 OFFICE O/P EST LOW 20 MIN: CPT | Performed by: INTERNAL MEDICINE

## 2022-05-31 PROCEDURE — 99213 OFFICE O/P EST LOW 20 MIN: CPT | Performed by: NURSE PRACTITIONER

## 2022-05-31 RX ORDER — ADALIMUMAB 40MG/0.4ML
0.4 ML KIT SUBCUTANEOUS EVERY OTHER WEEK
Status: ON HOLD | COMMUNITY

## 2022-05-31 RX ORDER — AMITRIPTYLINE HYDROCHLORIDE 10 MG/1
TAKE 1 TABLET BY MOUTH EVERYDAY AT BEDTIME TABLET, FILM COATED ORAL
Qty: 90 TABLETS | Refills: 3 | Status: ON HOLD | COMMUNITY

## 2022-05-31 RX ORDER — BALSALAZIDE DISODIUM 750 MG/1
2 PO BID CAPSULE ORAL TWICE A DAY
Qty: 360 CAPSULES | Refills: 3 | Status: ON HOLD | COMMUNITY

## 2022-05-31 RX ORDER — HYOSCYAMINE SULFATE 0.38 MG/1
TAKE 1 TABLET (0.375 MG) BY ORAL ROUTE EVERY 12 HOURS FOR 30 DAYS TABLET ORAL
Qty: 60 TABLETS | Refills: 5 | Status: ON HOLD | COMMUNITY

## 2022-05-31 RX ORDER — COLESTIPOL HYDROCHLORIDE 1 G/1
2 TABLETS TABLET, FILM COATED ORAL ONCE A DAY
Qty: 60 | OUTPATIENT
Start: 2022-05-31

## 2022-05-31 RX ORDER — LEVOTHYROXINE SODIUM 25 UG/1
TABLET ORAL
Qty: 0 | Refills: 0 | Status: ACTIVE | COMMUNITY
Start: 1900-01-01

## 2022-05-31 RX ORDER — COLCHICINE 0.6 MG/1
1 CAPSULE CAPSULE ORAL
Status: ACTIVE | COMMUNITY

## 2022-05-31 RX ORDER — PREDNISONE 5 MG/1
1 TABLET TABLET ORAL ONCE A DAY
Status: ACTIVE | COMMUNITY

## 2022-05-31 RX ORDER — PANTOPRAZOLE SODIUM 40 MG/1
1 TABLET TABLET, DELAYED RELEASE ORAL ONCE A DAY
Status: ON HOLD | COMMUNITY

## 2022-05-31 RX ORDER — LOSARTAN POTASSIUM AND HYDROCHLOROTHIAZIDE 100; 12.5 MG/1; MG/1
TAKE 1 TABLET BY ORAL ROUTE ONCE DAILY TABLET, FILM COATED ORAL 1
Qty: 0 | Refills: 0 | Status: ACTIVE | COMMUNITY
Start: 1900-01-01

## 2022-05-31 RX ORDER — KRILL/OM-3/DHA/EPA/PHOSPHO/AST 1000-230MG
1 TABLET CAPSULE ORAL ONCE A DAY
Status: ACTIVE | COMMUNITY

## 2022-05-31 NOTE — HPI-TODAY'S VISIT:
1 5/31/2022 Ms. Espitia is a 71-year-old female previously followed by Dr. Lauren with a past medical history of rheumatoid arthritis as well as possible history of UC.  Colonoscopy history is as below.  She reports that about 1 to 2 years ago, she started developing some issues with daily diarrhea.  Typically during the day.  Initially she had some blood which is when she had the 2020 colonoscopy which showed some inflammation that was nonspecific in her rectosigmoid.  This was accompanied by an elevated calprotectin in the 600s.  Loose stool continued despite taking Humira and balsalazide.  Eventually Humira was discontinued as patient's drug levels were low and high antibodies.  Dr. Sarkar has been trying to transition her to Cimzia anyway.  She has had no further bleeding, but she continues to have persistent loose stool.  Dr. Lauren did repeat a colonoscopy due to this that was normal with no evidence of UC or microscopic colitis.  She tried samples of Xifaxan, which were not helpful.  She is currently not on any medications within our practice and having about 3-5 loose stools daily with urgency. Sb   1/2022 Normal colonoscopy with normal random bx.  Colonoscopy 7/2020 with congested mucosa in the recto-sigmoid colon, path with questionable colitis, nonspecific.  EGD 1/29/20 normal EGD; gastric bx negative for H. pylori, esophageal bx c/w lymphocytic esophagitis (reflux related). Colonoscopy 12/2016-normal colon; single ulcer in the distal ileum with bx focally ulcerated TI, no granulomas.

## 2022-06-21 ENCOUNTER — TELEPHONE ENCOUNTER (OUTPATIENT)
Dept: URBAN - METROPOLITAN AREA CLINIC 82 | Facility: CLINIC | Age: 72
End: 2022-06-21

## 2022-06-21 RX ORDER — DICYCLOMINE HYDROCHLORIDE 10 MG/1
1 CAPSULES CAPSULE ORAL THREE TIMES A DAY
Qty: 90 CAPSULE | Refills: 3 | OUTPATIENT
Start: 2022-06-21 | End: 2022-10-19

## 2022-06-22 ENCOUNTER — ERX REFILL RESPONSE (OUTPATIENT)
Dept: URBAN - NONMETROPOLITAN AREA CLINIC 2 | Facility: CLINIC | Age: 72
End: 2022-06-22

## 2022-06-22 RX ORDER — COLESTIPOL HYDROCHLORIDE 1 G/1
TAKE 2 TABLETS BY MOUTH EVERY DAY FOR 30 DAYS TABLET, FILM COATED ORAL
Qty: 60 TABLET | Refills: 6 | OUTPATIENT

## 2022-06-22 RX ORDER — COLESTIPOL HYDROCHLORIDE 1 G/1
2 TABLETS TABLET, FILM COATED ORAL ONCE A DAY
Qty: 60 | OUTPATIENT

## 2022-08-30 ENCOUNTER — OFFICE VISIT (OUTPATIENT)
Dept: URBAN - NONMETROPOLITAN AREA CLINIC 2 | Facility: CLINIC | Age: 72
End: 2022-08-30
Payer: MEDICARE

## 2022-08-30 VITALS
HEART RATE: 83 BPM | TEMPERATURE: 97 F | HEIGHT: 66 IN | SYSTOLIC BLOOD PRESSURE: 139 MMHG | WEIGHT: 202 LBS | BODY MASS INDEX: 32.47 KG/M2 | DIASTOLIC BLOOD PRESSURE: 90 MMHG

## 2022-08-30 DIAGNOSIS — R19.5 LOOSE STOOLS: ICD-10-CM

## 2022-08-30 DIAGNOSIS — Z12.11 COLON CANCER SCREENING: ICD-10-CM

## 2022-08-30 DIAGNOSIS — K51.30 ULCERATIVE RECTOSIGMOIDITIS WITHOUT COMPLICATION: ICD-10-CM

## 2022-08-30 PROCEDURE — 99213 OFFICE O/P EST LOW 20 MIN: CPT | Performed by: NURSE PRACTITIONER

## 2022-08-30 RX ORDER — LEVOTHYROXINE SODIUM 25 UG/1
TABLET ORAL
Qty: 0 | Refills: 0 | Status: ACTIVE | COMMUNITY
Start: 1900-01-01

## 2022-08-30 RX ORDER — LOSARTAN POTASSIUM AND HYDROCHLOROTHIAZIDE 100; 12.5 MG/1; MG/1
TAKE 1 TABLET BY ORAL ROUTE ONCE DAILY TABLET, FILM COATED ORAL 1
Qty: 0 | Refills: 0 | Status: ACTIVE | COMMUNITY
Start: 1900-01-01

## 2022-08-30 RX ORDER — DICYCLOMINE HYDROCHLORIDE 10 MG/1
1 CAPSULES CAPSULE ORAL THREE TIMES A DAY
Qty: 90 CAPSULE | Refills: 3 | Status: ACTIVE | COMMUNITY
Start: 2022-06-21 | End: 2022-10-19

## 2022-08-30 RX ORDER — COLESTIPOL HYDROCHLORIDE 1 G/1
TAKE 2 TABLETS BY MOUTH EVERY DAY FOR 30 DAYS TABLET, FILM COATED ORAL
Qty: 60 TABLET | Refills: 6 | Status: ACTIVE | COMMUNITY

## 2022-08-30 RX ORDER — AMITRIPTYLINE HYDROCHLORIDE 10 MG/1
TAKE 1 TABLET BY MOUTH EVERYDAY AT BEDTIME TABLET, FILM COATED ORAL
Qty: 90 TABLETS | Refills: 3 | Status: ON HOLD | COMMUNITY

## 2022-08-30 RX ORDER — KRILL/OM-3/DHA/EPA/PHOSPHO/AST 1000-230MG
1 TABLET CAPSULE ORAL ONCE A DAY
Status: ACTIVE | COMMUNITY

## 2022-08-30 RX ORDER — PANTOPRAZOLE SODIUM 40 MG/1
1 TABLET TABLET, DELAYED RELEASE ORAL ONCE A DAY
Status: ON HOLD | COMMUNITY

## 2022-08-30 RX ORDER — ADALIMUMAB 40MG/0.4ML
0.4 ML KIT SUBCUTANEOUS EVERY OTHER WEEK
Status: ON HOLD | COMMUNITY

## 2022-08-30 RX ORDER — COLCHICINE 0.6 MG/1
1 CAPSULE CAPSULE ORAL
Status: ACTIVE | COMMUNITY

## 2022-08-30 RX ORDER — PREDNISONE 5 MG/1
1 TABLET TABLET ORAL ONCE A DAY
Status: ACTIVE | COMMUNITY

## 2022-08-30 RX ORDER — BALSALAZIDE DISODIUM 750 MG/1
2 PO BID CAPSULE ORAL TWICE A DAY
Qty: 360 CAPSULES | Refills: 3 | Status: ON HOLD | COMMUNITY

## 2022-08-30 RX ORDER — HYOSCYAMINE SULFATE 0.38 MG/1
TAKE 1 TABLET (0.375 MG) BY ORAL ROUTE EVERY 12 HOURS FOR 30 DAYS TABLET ORAL
Qty: 60 TABLETS | Refills: 5 | Status: ON HOLD | COMMUNITY

## 2022-08-30 NOTE — HPI-TODAY'S VISIT:
Ms. Espitia is a 71-year-old female previously followed by Dr. Lauren with a past medical history of rheumatoid arthritis as well as possible history of UC.  Colonoscopy history is as below.  She reports that about 1 to 2 years ago, she started developing some issues with daily diarrhea.  Typically during the day.  Initially she had some blood which is when she had the 2020 colonoscopy which showed some inflammation that was nonspecific in her rectosigmoid.  This was accompanied by an elevated calprotectin in the 600s.  Loose stool continued despite taking Humira and balsalazide.  Eventually Humira was discontinued as patient's drug levels were low and high antibodies.  Dr. Sarkar has been trying to transition her to Cimzia anyway.  She has had no further bleeding, but she continues to have persistent loose stool.  Dr. Lauren did repeat a colonoscopy due to this that was normal with no evidence of UC or microscopic colitis.  She tried samples of Xifaxan, which were not helpful.  colestid made issues worse.   Today, she is actually doing well on bentyl. still 2-3x daily, but no blood. just in am. no pain. Sb   1/2022 Normal colonoscopy with normal random bx.  Colonoscopy 7/2020 with congested mucosa in the recto-sigmoid colon, path with questionable colitis, nonspecific.  EGD 1/29/20 normal EGD; gastric bx negative for H. pylori, esophageal bx c/w lymphocytic esophagitis (reflux related). Colonoscopy 12/2016-normal colon; single ulcer in the distal ileum with bx focally ulcerated TI, no granulomas.

## 2022-11-15 ENCOUNTER — ERX REFILL RESPONSE (OUTPATIENT)
Dept: URBAN - NONMETROPOLITAN AREA CLINIC 2 | Facility: CLINIC | Age: 72
End: 2022-11-15

## 2022-11-15 RX ORDER — DICYCLOMINE HYDROCHLORIDE 10 MG/1
TAKE 1 CAPSULE BY MOUTH 3 TIMES A DAY CAPSULE ORAL
Qty: 270 CAPSULE | Refills: 2 | OUTPATIENT

## 2022-11-15 RX ORDER — DICYCLOMINE HYDROCHLORIDE 10 MG/1
TAKE 1 CAPSULE BY MOUTH 3 TIMES A DAY CAPSULE ORAL
Qty: 270 CAPSULE | Refills: 1 | OUTPATIENT

## 2023-01-19 ENCOUNTER — TELEPHONE ENCOUNTER (OUTPATIENT)
Dept: URBAN - NONMETROPOLITAN AREA CLINIC 2 | Facility: CLINIC | Age: 73
End: 2023-01-19

## 2023-01-20 ENCOUNTER — OFFICE VISIT (OUTPATIENT)
Dept: URBAN - NONMETROPOLITAN AREA CLINIC 2 | Facility: CLINIC | Age: 73
End: 2023-01-20

## 2023-01-20 RX ORDER — HYOSCYAMINE SULFATE 0.38 MG/1
TAKE 1 TABLET (0.375 MG) BY ORAL ROUTE EVERY 12 HOURS FOR 30 DAYS TABLET ORAL
Qty: 60 TABLETS | Refills: 5 | Status: ON HOLD | COMMUNITY

## 2023-01-20 RX ORDER — COLESTIPOL HYDROCHLORIDE 1 G/1
TAKE 2 TABLETS BY MOUTH EVERY DAY FOR 30 DAYS TABLET, FILM COATED ORAL
Qty: 60 TABLET | Refills: 6 | Status: ACTIVE | COMMUNITY

## 2023-01-20 RX ORDER — ADALIMUMAB 40MG/0.4ML
0.4 ML KIT SUBCUTANEOUS EVERY OTHER WEEK
Status: ON HOLD | COMMUNITY

## 2023-01-20 RX ORDER — BALSALAZIDE DISODIUM 750 MG/1
2 PO BID CAPSULE ORAL TWICE A DAY
Qty: 360 CAPSULES | Refills: 3 | Status: ON HOLD | COMMUNITY

## 2023-01-20 RX ORDER — PREDNISONE 5 MG/1
1 TABLET TABLET ORAL ONCE A DAY
Status: ACTIVE | COMMUNITY

## 2023-01-20 RX ORDER — LEVOTHYROXINE SODIUM 25 UG/1
TABLET ORAL
Qty: 0 | Refills: 0 | Status: ACTIVE | COMMUNITY
Start: 1900-01-01

## 2023-01-20 RX ORDER — DICYCLOMINE HYDROCHLORIDE 10 MG/1
TAKE 1 CAPSULE BY MOUTH 3 TIMES A DAY CAPSULE ORAL
Qty: 270 CAPSULE | Refills: 1 | Status: ACTIVE | COMMUNITY

## 2023-01-20 RX ORDER — PANTOPRAZOLE SODIUM 40 MG/1
1 TABLET TABLET, DELAYED RELEASE ORAL ONCE A DAY
Status: ON HOLD | COMMUNITY

## 2023-01-20 RX ORDER — KRILL/OM-3/DHA/EPA/PHOSPHO/AST 1000-230MG
1 TABLET CAPSULE ORAL ONCE A DAY
Status: ACTIVE | COMMUNITY

## 2023-01-20 RX ORDER — COLCHICINE 0.6 MG/1
1 CAPSULE CAPSULE ORAL
Status: ACTIVE | COMMUNITY

## 2023-01-20 RX ORDER — AMITRIPTYLINE HYDROCHLORIDE 10 MG/1
TAKE 1 TABLET BY MOUTH EVERYDAY AT BEDTIME TABLET, FILM COATED ORAL
Qty: 90 TABLETS | Refills: 3 | Status: ON HOLD | COMMUNITY

## 2023-01-20 RX ORDER — LOSARTAN POTASSIUM AND HYDROCHLOROTHIAZIDE 100; 12.5 MG/1; MG/1
TAKE 1 TABLET BY ORAL ROUTE ONCE DAILY TABLET, FILM COATED ORAL 1
Qty: 0 | Refills: 0 | Status: ACTIVE | COMMUNITY
Start: 1900-01-01

## 2023-01-20 NOTE — HPI-TODAY'S VISIT:
1/20/23: Ms. Espitia returns for follow-up of nonspecific ileitis and colitis, possibly Crohn's versus UC.  Since her last clinic visit, she   8/30/22: Ms. Espitia is a 71-year-old female previously followed by Dr. Lauren with a past medical history of rheumatoid arthritis as well as possible history of UC.  Colonoscopy history is as below.  She reports that about 1 to 2 years ago, she started developing some issues with daily diarrhea.  Typically during the day.  Initially she had some blood which is when she had the 2020 colonoscopy which showed some inflammation that was nonspecific in her rectosigmoid.  This was accompanied by an elevated calprotectin in the 600s.  Loose stool continued despite taking Humira and balsalazide.  Eventually Humira was discontinued as patient's drug levels were low and high antibodies.  Dr. Sarkar has been trying to transition her to Cimzia anyway.  She has had no further bleeding, but she continues to have persistent loose stool.  Dr. Lauren did repeat a colonoscopy due to this that was normal with no evidence of UC or microscopic colitis.  She tried samples of Xifaxan, which were not helpful.  colestid made issues worse.   Today, she is actually doing well on bentyl. still 2-3x daily, but no blood. just in am. no pain. Sb   1/2022 Normal colonoscopy with normal random bx.  Colonoscopy 7/2020 with congested mucosa in the recto-sigmoid colon, path with questionable colitis, nonspecific.  EGD 1/29/20 normal EGD; gastric bx negative for H. pylori, esophageal bx c/w lymphocytic esophagitis (reflux related). Colonoscopy 12/2016-normal colon; single ulcer in the distal ileum with bx focally ulcerated TI, no granulomas.

## 2023-01-26 ENCOUNTER — OFFICE VISIT (OUTPATIENT)
Dept: URBAN - NONMETROPOLITAN AREA CLINIC 2 | Facility: CLINIC | Age: 73
End: 2023-01-26
Payer: MEDICARE

## 2023-01-26 VITALS
HEART RATE: 102 BPM | BODY MASS INDEX: 31.18 KG/M2 | SYSTOLIC BLOOD PRESSURE: 134 MMHG | DIASTOLIC BLOOD PRESSURE: 82 MMHG | WEIGHT: 194 LBS | TEMPERATURE: 98.4 F | HEIGHT: 66 IN

## 2023-01-26 DIAGNOSIS — K51.30 ULCERATIVE RECTOSIGMOIDITIS WITHOUT COMPLICATION: ICD-10-CM

## 2023-01-26 DIAGNOSIS — Z12.11 COLON CANCER SCREENING: ICD-10-CM

## 2023-01-26 DIAGNOSIS — R19.5 LOOSE STOOLS: ICD-10-CM

## 2023-01-26 PROBLEM — 41364008: Status: ACTIVE | Noted: 2022-05-31

## 2023-01-26 PROCEDURE — 99213 OFFICE O/P EST LOW 20 MIN: CPT | Performed by: NURSE PRACTITIONER

## 2023-01-26 RX ORDER — COLESTIPOL HYDROCHLORIDE 1 G/1
TAKE 2 TABLETS BY MOUTH EVERY DAY FOR 30 DAYS TABLET, FILM COATED ORAL
Qty: 60 TABLET | Refills: 6 | Status: ACTIVE | COMMUNITY

## 2023-01-26 RX ORDER — COLCHICINE 0.6 MG/1
1 CAPSULE CAPSULE ORAL
Status: ACTIVE | COMMUNITY

## 2023-01-26 RX ORDER — HYOSCYAMINE SULFATE 0.38 MG/1
TAKE 1 TABLET (0.375 MG) BY ORAL ROUTE EVERY 12 HOURS FOR 30 DAYS TABLET ORAL
Qty: 60 TABLETS | Refills: 5 | Status: ON HOLD | COMMUNITY

## 2023-01-26 RX ORDER — LEVOTHYROXINE SODIUM 25 UG/1
TABLET ORAL
Qty: 0 | Refills: 0 | Status: ACTIVE | COMMUNITY
Start: 1900-01-01

## 2023-01-26 RX ORDER — LOSARTAN POTASSIUM AND HYDROCHLOROTHIAZIDE 100; 12.5 MG/1; MG/1
TAKE 1 TABLET BY ORAL ROUTE ONCE DAILY TABLET, FILM COATED ORAL 1
Qty: 0 | Refills: 0 | Status: ACTIVE | COMMUNITY
Start: 1900-01-01

## 2023-01-26 RX ORDER — BALSALAZIDE DISODIUM 750 MG/1
2 PO BID CAPSULE ORAL TWICE A DAY
Qty: 360 CAPSULES | Refills: 3 | Status: ON HOLD | COMMUNITY

## 2023-01-26 RX ORDER — CHOLESTYRAMINE 4 G/9G
1 SCOOP POWDER, FOR SUSPENSION ORAL ONCE A DAY
Qty: 30 | Refills: 6 | OUTPATIENT
Start: 2023-01-26

## 2023-01-26 RX ORDER — DICYCLOMINE HYDROCHLORIDE 10 MG/1
TAKE 1 CAPSULE BY MOUTH 3 TIMES A DAY CAPSULE ORAL
Qty: 270 CAPSULE | Refills: 1 | Status: ACTIVE | COMMUNITY

## 2023-01-26 RX ORDER — PREDNISONE 5 MG/1
1 TABLET TABLET ORAL ONCE A DAY
Status: ACTIVE | COMMUNITY

## 2023-01-26 RX ORDER — PANTOPRAZOLE SODIUM 40 MG/1
1 TABLET TABLET, DELAYED RELEASE ORAL ONCE A DAY
Status: ON HOLD | COMMUNITY

## 2023-01-26 RX ORDER — KRILL/OM-3/DHA/EPA/PHOSPHO/AST 1000-230MG
1 TABLET CAPSULE ORAL ONCE A DAY
Status: ACTIVE | COMMUNITY

## 2023-01-26 RX ORDER — AMITRIPTYLINE HYDROCHLORIDE 10 MG/1
TAKE 1 TABLET BY MOUTH EVERYDAY AT BEDTIME TABLET, FILM COATED ORAL
Qty: 90 TABLETS | Refills: 3 | Status: ON HOLD | COMMUNITY

## 2023-01-26 RX ORDER — ADALIMUMAB 40MG/0.4ML
0.4 ML KIT SUBCUTANEOUS EVERY OTHER WEEK
Status: ON HOLD | COMMUNITY

## 2023-01-26 NOTE — HPI-TODAY'S VISIT:
1/26/23: Ms. Espitia returns for follow-up of nonspecific ileitis and colitis, possibly Crohn's versus UC.  Since her last clinic visit, she started having an increase in loose stool and bleeding. she reports that nothing tried in the past was helpful. no nocturnal complaints. some cramping. complaints worse in am. sb  8/30/22: Ms. Espitia is a 71-year-old female previously followed by Dr. Lauren with a past medical history of rheumatoid arthritis as well as possible history of UC.  Colonoscopy history is as below.  She reports that about 1 to 2 years ago, she started developing some issues with daily diarrhea.  Typically during the day.  Initially she had some blood which is when she had the 2020 colonoscopy which showed some inflammation that was nonspecific in her rectosigmoid.  This was accompanied by an elevated calprotectin in the 600s.  Loose stool continued despite taking Humira and balsalazide.  Eventually Humira was discontinued as patient's drug levels were low and high antibodies.  Dr. Sarkar has been trying to transition her to Cimzia anyway.  She has had no further bleeding, but she continues to have persistent loose stool.  Dr. Lauren did repeat a colonoscopy due to this that was normal with no evidence of UC or microscopic colitis.  She tried samples of Xifaxan, which were not helpful.  colestid made issues worse.   Today, she is actually doing well on bentyl. still 2-3x daily, but no blood. just in am. no pain. Sb   1/2022 Normal colonoscopy with normal random bx.  Colonoscopy 7/2020 with congested mucosa in the recto-sigmoid colon, path with questionable colitis, nonspecific.  EGD 1/29/20 normal EGD; gastric bx negative for H. pylori, esophageal bx c/w lymphocytic esophagitis (reflux related). Colonoscopy 12/2016-normal colon; single ulcer in the distal ileum with bx focally ulcerated TI, no granulomas.

## 2023-02-02 ENCOUNTER — TELEPHONE ENCOUNTER (OUTPATIENT)
Dept: URBAN - NONMETROPOLITAN AREA CLINIC 2 | Facility: CLINIC | Age: 73
End: 2023-02-02

## 2023-02-02 RX ORDER — PREDNISONE 20 MG/1
40MG X7 DAYS, 30MGX 7 DAYS, 20MG X 7 DAYS, AND 10MG X7 DAYS AND STOP TABLET ORAL ONCE A DAY
Qty: 35 TABLET | Refills: 0 | OUTPATIENT
Start: 2023-02-06 | End: 2023-03-06

## 2023-02-02 RX ORDER — MESALAMINE 1000 MG/1
1 SUPPOSITORY AT BEDTIME SUPPOSITORY RECTAL ONCE A DAY
Qty: 30 | Refills: 3 | OUTPATIENT
Start: 2023-02-02 | End: 2023-06-02

## 2023-02-20 ENCOUNTER — ERX REFILL RESPONSE (OUTPATIENT)
Dept: URBAN - NONMETROPOLITAN AREA CLINIC 2 | Facility: CLINIC | Age: 73
End: 2023-02-20

## 2023-02-20 RX ORDER — CHOLESTYRAMINE 4 G/9G
1 SCOOP POWDER, FOR SUSPENSION ORAL ONCE A DAY
Qty: 30 | Refills: 6 | OUTPATIENT

## 2023-02-20 RX ORDER — CHOLESTYRAMINE POWDER FOR SUSPENSION 4 G/8.78G
TAKE 1 SCOOP ORALLY EVERY DAY FOR 30 DAYS POWDER, FOR SUSPENSION ORAL
Qty: 30 PACKET | Refills: 6 | OUTPATIENT

## 2023-02-23 ENCOUNTER — TELEPHONE ENCOUNTER (OUTPATIENT)
Dept: URBAN - NONMETROPOLITAN AREA CLINIC 2 | Facility: CLINIC | Age: 73
End: 2023-02-23

## 2023-02-23 RX ORDER — CHOLESTYRAMINE POWDER FOR SUSPENSION 4 G/8.78G
TAKE 1 SCOOP ORALLY EVERY DAY FOR 30 DAYS POWDER, FOR SUSPENSION ORAL
Qty: 30 PACKET | Refills: 6

## 2023-04-10 ENCOUNTER — TELEPHONE ENCOUNTER (OUTPATIENT)
Dept: URBAN - NONMETROPOLITAN AREA CLINIC 2 | Facility: CLINIC | Age: 73
End: 2023-04-10

## 2023-04-11 ENCOUNTER — LAB OUTSIDE AN ENCOUNTER (OUTPATIENT)
Dept: URBAN - NONMETROPOLITAN AREA CLINIC 2 | Facility: CLINIC | Age: 73
End: 2023-04-11

## 2023-04-27 ENCOUNTER — OFFICE VISIT (OUTPATIENT)
Dept: URBAN - NONMETROPOLITAN AREA SURGERY CENTER 1 | Facility: SURGERY CENTER | Age: 73
End: 2023-04-27
Payer: MEDICARE

## 2023-04-27 ENCOUNTER — TELEPHONE ENCOUNTER (OUTPATIENT)
Dept: URBAN - METROPOLITAN AREA CLINIC 35 | Facility: CLINIC | Age: 73
End: 2023-04-27

## 2023-04-27 ENCOUNTER — CLAIMS CREATED FROM THE CLAIM WINDOW (OUTPATIENT)
Dept: URBAN - METROPOLITAN AREA CLINIC 4 | Facility: CLINIC | Age: 73
End: 2023-04-27
Payer: MEDICARE

## 2023-04-27 DIAGNOSIS — R19.7 ACUTE DIARRHEA: ICD-10-CM

## 2023-04-27 DIAGNOSIS — K92.1 ACUTE MELENA: ICD-10-CM

## 2023-04-27 DIAGNOSIS — K52.3 COLITIS, INDETERMINATE: ICD-10-CM

## 2023-04-27 DIAGNOSIS — K52.3 INDETERMINATE COLITIS: ICD-10-CM

## 2023-04-27 PROCEDURE — G8907 PT DOC NO EVENTS ON DISCHARG: HCPCS | Performed by: INTERNAL MEDICINE

## 2023-04-27 PROCEDURE — 88305 TISSUE EXAM BY PATHOLOGIST: CPT | Performed by: PATHOLOGY

## 2023-04-27 PROCEDURE — 45380 COLONOSCOPY AND BIOPSY: CPT | Performed by: INTERNAL MEDICINE

## 2023-04-27 RX ORDER — SULFASALAZINE 500 MG/1
1 TABLET TABLET ORAL THREE TIMES A DAY
Qty: 90 TABLET | Refills: 11 | OUTPATIENT
Start: 2023-04-27 | End: 2024-04-21

## 2023-04-27 RX ORDER — ADALIMUMAB 40MG/0.4ML
0.4 ML KIT SUBCUTANEOUS EVERY OTHER WEEK
Status: ON HOLD | COMMUNITY

## 2023-04-27 RX ORDER — DICYCLOMINE HYDROCHLORIDE 10 MG/1
TAKE 1 CAPSULE BY MOUTH 3 TIMES A DAY CAPSULE ORAL
Qty: 270 CAPSULE | Refills: 1 | Status: ACTIVE | COMMUNITY

## 2023-04-27 RX ORDER — PREDNISONE 5 MG/1
1 TABLET TABLET ORAL ONCE A DAY
Status: ACTIVE | COMMUNITY

## 2023-04-27 RX ORDER — LOSARTAN POTASSIUM AND HYDROCHLOROTHIAZIDE 100; 12.5 MG/1; MG/1
TAKE 1 TABLET BY ORAL ROUTE ONCE DAILY TABLET, FILM COATED ORAL 1
Qty: 0 | Refills: 0 | Status: ACTIVE | COMMUNITY
Start: 1900-01-01

## 2023-04-27 RX ORDER — HYOSCYAMINE SULFATE 0.38 MG/1
TAKE 1 TABLET (0.375 MG) BY ORAL ROUTE EVERY 12 HOURS FOR 30 DAYS TABLET ORAL
Qty: 60 TABLETS | Refills: 5 | Status: ON HOLD | COMMUNITY

## 2023-04-27 RX ORDER — COLCHICINE 0.6 MG/1
1 CAPSULE CAPSULE ORAL
Status: ACTIVE | COMMUNITY

## 2023-04-27 RX ORDER — AMITRIPTYLINE HYDROCHLORIDE 10 MG/1
TAKE 1 TABLET BY MOUTH EVERYDAY AT BEDTIME TABLET, FILM COATED ORAL
Qty: 90 TABLETS | Refills: 3 | Status: ON HOLD | COMMUNITY

## 2023-04-27 RX ORDER — PREDNISONE 20 MG/1
40MG X7 DAYS, 30MGX 7 DAYS, 20MG X 7 DAYS, AND 10MG X7 DAYS AND STOP TABLET ORAL ONCE A DAY
Qty: 35 TABLET | Refills: 0 | OUTPATIENT
Start: 2023-04-27 | End: 2023-05-25

## 2023-04-27 RX ORDER — BALSALAZIDE DISODIUM 750 MG/1
2 PO BID CAPSULE ORAL TWICE A DAY
Qty: 360 CAPSULES | Refills: 3 | Status: ON HOLD | COMMUNITY

## 2023-04-27 RX ORDER — COLESTIPOL HYDROCHLORIDE 1 G/1
TAKE 2 TABLETS BY MOUTH EVERY DAY FOR 30 DAYS TABLET, FILM COATED ORAL
Qty: 60 TABLET | Refills: 6 | Status: ACTIVE | COMMUNITY

## 2023-04-27 RX ORDER — MESALAMINE 1000 MG/1
1 SUPPOSITORY AT BEDTIME SUPPOSITORY RECTAL ONCE A DAY
Qty: 30 | Refills: 3 | Status: ACTIVE | COMMUNITY
Start: 2023-02-02 | End: 2023-06-02

## 2023-04-27 RX ORDER — CHOLESTYRAMINE POWDER FOR SUSPENSION 4 G/8.78G
TAKE 1 SCOOP ORALLY EVERY DAY FOR 30 DAYS POWDER, FOR SUSPENSION ORAL
Qty: 30 PACKET | Refills: 6 | Status: ACTIVE | COMMUNITY

## 2023-04-27 RX ORDER — LEVOTHYROXINE SODIUM 25 UG/1
TABLET ORAL
Qty: 0 | Refills: 0 | Status: ACTIVE | COMMUNITY
Start: 1900-01-01

## 2023-04-27 RX ORDER — KRILL/OM-3/DHA/EPA/PHOSPHO/AST 1000-230MG
1 TABLET CAPSULE ORAL ONCE A DAY
Status: ACTIVE | COMMUNITY

## 2023-04-27 RX ORDER — PANTOPRAZOLE SODIUM 40 MG/1
1 TABLET TABLET, DELAYED RELEASE ORAL ONCE A DAY
Status: ON HOLD | COMMUNITY

## 2023-05-23 ENCOUNTER — ERX REFILL RESPONSE (OUTPATIENT)
Dept: URBAN - NONMETROPOLITAN AREA CLINIC 2 | Facility: CLINIC | Age: 73
End: 2023-05-23

## 2023-05-23 RX ORDER — PREDNISONE 20 MG/1
TAKE 2 TABLETS BY MOUTH FOR 7 DAYS. TAKE ONE AND ONE-HALF TABLETS BY MOUTH FOR 1 WEEK. TAKE 1 TABLET BY MOUTH FOR 1 WEEK. THEN TAKE ONE-HALF TABLET BY MOUTH FOR 1 WEEK AND STOP TABLET ORAL
Qty: 35 TABLET | Refills: 0 | OUTPATIENT

## 2023-05-24 ENCOUNTER — OFFICE VISIT (OUTPATIENT)
Dept: URBAN - NONMETROPOLITAN AREA CLINIC 2 | Facility: CLINIC | Age: 73
End: 2023-05-24
Payer: MEDICARE

## 2023-05-24 VITALS
HEIGHT: 66 IN | SYSTOLIC BLOOD PRESSURE: 159 MMHG | TEMPERATURE: 97.5 F | HEART RATE: 106 BPM | WEIGHT: 206 LBS | DIASTOLIC BLOOD PRESSURE: 102 MMHG | BODY MASS INDEX: 33.11 KG/M2

## 2023-05-24 DIAGNOSIS — K50.10 CROHN'S DISEASE OF LARGE INTESTINE WITHOUT COMPLICATION: ICD-10-CM

## 2023-05-24 DIAGNOSIS — R19.5 LOOSE STOOLS: ICD-10-CM

## 2023-05-24 DIAGNOSIS — Z12.11 COLON CANCER SCREENING: ICD-10-CM

## 2023-05-24 PROBLEM — 7620006: Status: ACTIVE | Noted: 2023-05-24

## 2023-05-24 PROCEDURE — 99213 OFFICE O/P EST LOW 20 MIN: CPT | Performed by: NURSE PRACTITIONER

## 2023-05-24 RX ORDER — BALSALAZIDE DISODIUM 750 MG/1
2 PO BID CAPSULE ORAL TWICE A DAY
Qty: 360 CAPSULES | Refills: 3 | Status: ON HOLD | COMMUNITY

## 2023-05-24 RX ORDER — LOSARTAN POTASSIUM AND HYDROCHLOROTHIAZIDE 100; 12.5 MG/1; MG/1
TAKE 1 TABLET BY ORAL ROUTE ONCE DAILY TABLET, FILM COATED ORAL 1
Qty: 0 | Refills: 0 | Status: ACTIVE | COMMUNITY
Start: 1900-01-01

## 2023-05-24 RX ORDER — COLESTIPOL HYDROCHLORIDE 1 G/1
TAKE 2 TABLETS BY MOUTH EVERY DAY FOR 30 DAYS TABLET, FILM COATED ORAL
Qty: 60 TABLET | Refills: 6 | Status: ACTIVE | COMMUNITY

## 2023-05-24 RX ORDER — KRILL/OM-3/DHA/EPA/PHOSPHO/AST 1000-230MG
1 TABLET CAPSULE ORAL ONCE A DAY
Status: ACTIVE | COMMUNITY

## 2023-05-24 RX ORDER — LEVOTHYROXINE SODIUM 25 UG/1
TABLET ORAL
Qty: 0 | Refills: 0 | Status: ACTIVE | COMMUNITY
Start: 1900-01-01

## 2023-05-24 RX ORDER — HYOSCYAMINE SULFATE 0.38 MG/1
TAKE 1 TABLET (0.375 MG) BY ORAL ROUTE EVERY 12 HOURS FOR 30 DAYS TABLET ORAL
Qty: 60 TABLETS | Refills: 5 | Status: ON HOLD | COMMUNITY

## 2023-05-24 RX ORDER — MESALAMINE 1000 MG/1
1 SUPPOSITORY AT BEDTIME SUPPOSITORY RECTAL ONCE A DAY
Qty: 30 | Refills: 3 | Status: ACTIVE | COMMUNITY
Start: 2023-02-02 | End: 2023-06-02

## 2023-05-24 RX ORDER — DICYCLOMINE HYDROCHLORIDE 10 MG/1
TAKE 1 CAPSULE BY MOUTH 3 TIMES A DAY CAPSULE ORAL
Qty: 270 CAPSULE | Refills: 1 | Status: ACTIVE | COMMUNITY

## 2023-05-24 RX ORDER — PANTOPRAZOLE SODIUM 40 MG/1
1 TABLET TABLET, DELAYED RELEASE ORAL ONCE A DAY
Status: ON HOLD | COMMUNITY

## 2023-05-24 RX ORDER — AMITRIPTYLINE HYDROCHLORIDE 10 MG/1
TAKE 1 TABLET BY MOUTH EVERYDAY AT BEDTIME TABLET, FILM COATED ORAL
Qty: 90 TABLETS | Refills: 3 | Status: ON HOLD | COMMUNITY

## 2023-05-24 RX ORDER — PREDNISONE 5 MG/1
1 TABLET TABLET ORAL ONCE A DAY
Status: ACTIVE | COMMUNITY

## 2023-05-24 RX ORDER — CHOLESTYRAMINE POWDER FOR SUSPENSION 4 G/8.78G
TAKE 1 SCOOP ORALLY EVERY DAY FOR 30 DAYS POWDER, FOR SUSPENSION ORAL
Qty: 30 PACKET | Refills: 6 | Status: ACTIVE | COMMUNITY

## 2023-05-24 RX ORDER — SULFASALAZINE 500 MG/1
1 TABLET TABLET ORAL THREE TIMES A DAY
Qty: 90 TABLET | Refills: 11 | Status: ACTIVE | COMMUNITY
Start: 2023-04-27 | End: 2024-04-21

## 2023-05-24 RX ORDER — COLCHICINE 0.6 MG/1
1 CAPSULE CAPSULE ORAL
Status: ACTIVE | COMMUNITY

## 2023-05-24 RX ORDER — ADALIMUMAB 40MG/0.4ML
0.4 ML KIT SUBCUTANEOUS EVERY OTHER WEEK
Status: ON HOLD | COMMUNITY

## 2023-05-24 RX ORDER — PREDNISONE 20 MG/1
TAKE 2 TABLETS BY MOUTH FOR 7 DAYS. TAKE ONE AND ONE-HALF TABLETS BY MOUTH FOR 1 WEEK. TAKE 1 TABLET BY MOUTH FOR 1 WEEK. THEN TAKE ONE-HALF TABLET BY MOUTH FOR 1 WEEK AND STOP TABLET ORAL
Qty: 35 TABLET | Refills: 0 | Status: ACTIVE | COMMUNITY

## 2023-05-24 NOTE — HPI-OTHER HISTORIES
8/30/22: Ms. Espitia is a 71-year-old female previously followed by Dr. Lauren with a past medical history of rheumatoid arthritis as well as possible history of UC.  Colonoscopy history is as below.  She reports that about 1 to 2 years ago, she started developing some issues with daily diarrhea.  Typically during the day.  Initially she had some blood which is when she had the 2020 colonoscopy which showed some inflammation that was nonspecific in her rectosigmoid.  This was accompanied by an elevated calprotectin in the 600s.  Loose stool continued despite taking Humira and balsalazide.  Eventually Humira was discontinued as patient's drug levels were low and high antibodies.  Dr. Sarkar has been trying to transition her to Cimzia anyway.  She has had no further bleeding, but she continues to have persistent loose stool.  Dr. Lauren did repeat a colonoscopy due to this that was normal with no evidence of UC or microscopic colitis.  She tried samples of Xifaxan, which were not helpful.  colestid made issues worse.

## 2023-05-24 NOTE — HPI-TODAY'S VISIT:
Ms. Espitia returns for follow-up of nonspecific ileitis and colitis, possibly Crohn's versus UC.  Since her last clinic visit, she had a flex sig, which seemed more consistent with crohns. she was given pred course and placed on sulfasalazine and is currently doing well. no pain. no urgency. no bleeding. stools are soft. SB  4/27/23 Flex sig with erosions asced, desced, and transverse- path crohns vs UC 1/2022 Normal colonoscopy with normal random bx.  Colonoscopy 7/2020 with congested mucosa in the recto-sigmoid colon, path with questionable colitis, nonspecific.  EGD 1/29/20 normal EGD; gastric bx negative for H. pylori, esophageal bx c/w lymphocytic esophagitis (reflux related). Colonoscopy 12/2016-normal colon; single ulcer in the distal ileum with bx focally ulcerated TI, no granulomas.

## 2023-06-19 ENCOUNTER — ERX REFILL RESPONSE (OUTPATIENT)
Dept: URBAN - NONMETROPOLITAN AREA CLINIC 2 | Facility: CLINIC | Age: 73
End: 2023-06-19

## 2023-06-19 RX ORDER — PREDNISONE 20 MG/1
TAKE 2 TABLETS BY MOUTH FOR 7 DAYS. TAKE ONE AND ONE-HALF TABLETS BY MOUTH FOR 1 WEEK. TAKE 1 TABLET BY MOUTH FOR 1 WEEK. THEN TAKE ONE-HALF TABLET BY MOUTH FOR 1 WEEK AND STOP TABLET ORAL
Qty: 35 TABLET | Refills: 0 | OUTPATIENT

## 2023-09-26 ENCOUNTER — TELEPHONE ENCOUNTER (OUTPATIENT)
Dept: URBAN - NONMETROPOLITAN AREA CLINIC 2 | Facility: CLINIC | Age: 73
End: 2023-09-26

## 2023-09-26 RX ORDER — PREDNISONE 20 MG/1
TAKE 2 TABLETS BY MOUTH FOR 7 DAYS. TAKE ONE AND ONE-HALF TABLETS BY MOUTH FOR 1 WEEK. TAKE 1 TABLET BY MOUTH FOR 1 WEEK. THEN TAKE ONE-HALF TABLET BY MOUTH FOR 1 WEEK AND STOP TABLET ORAL
Qty: 35 TABLET | Refills: 0

## 2023-10-16 ENCOUNTER — ERX REFILL RESPONSE (OUTPATIENT)
Dept: URBAN - NONMETROPOLITAN AREA CLINIC 2 | Facility: CLINIC | Age: 73
End: 2023-10-16

## 2023-10-16 RX ORDER — PREDNISONE 20 MG/1
TAKE 2 TABLETS BY MOUTH FOR 7 DAYS. TAKE ONE AND ONE-HALF TABLETS BY MOUTH FOR 1 WEEK. TAKE 1 TABLET BY MOUTH FOR 1 WEEK. THEN TAKE ONE-HALF TABLET BY MOUTH FOR 1 WEEK AND STOP TABLET ORAL
Qty: 35 TABLET | Refills: 0 | OUTPATIENT

## 2023-11-09 ENCOUNTER — P2P PATIENT RECORD (OUTPATIENT)
Age: 73
End: 2023-11-09

## 2023-11-27 ENCOUNTER — ERX REFILL RESPONSE (OUTPATIENT)
Dept: URBAN - NONMETROPOLITAN AREA CLINIC 2 | Facility: CLINIC | Age: 73
End: 2023-11-27

## 2023-11-27 RX ORDER — PREDNISONE 20 MG/1
TAKE 2 TABLETS BY MOUTH FOR 7 DAYS. TAKE ONE AND ONE-HALF TABLETS BY MOUTH FOR 1 WEEK. TAKE 1 TABLET BY MOUTH FOR 1 WEEK. THEN TAKE ONE-HALF TABLET BY MOUTH FOR 1 WEEK AND STOP TABLET ORAL
Qty: 35 TABLET | Refills: 0 | OUTPATIENT

## 2023-11-29 ENCOUNTER — DASHBOARD ENCOUNTERS (OUTPATIENT)
Age: 73
End: 2023-11-29

## 2023-11-29 ENCOUNTER — OFFICE VISIT (OUTPATIENT)
Dept: URBAN - NONMETROPOLITAN AREA CLINIC 2 | Facility: CLINIC | Age: 73
End: 2023-11-29
Payer: MEDICARE

## 2023-11-29 VITALS
WEIGHT: 208 LBS | BODY MASS INDEX: 33.43 KG/M2 | SYSTOLIC BLOOD PRESSURE: 143 MMHG | HEIGHT: 66 IN | DIASTOLIC BLOOD PRESSURE: 87 MMHG | TEMPERATURE: 98.1 F | HEART RATE: 111 BPM

## 2023-11-29 DIAGNOSIS — Z12.11 COLON CANCER SCREENING: ICD-10-CM

## 2023-11-29 DIAGNOSIS — R19.5 LOOSE STOOLS: ICD-10-CM

## 2023-11-29 DIAGNOSIS — K50.10 CROHN'S DISEASE OF LARGE INTESTINE WITHOUT COMPLICATION: ICD-10-CM

## 2023-11-29 PROCEDURE — 99214 OFFICE O/P EST MOD 30 MIN: CPT | Performed by: NURSE PRACTITIONER

## 2023-11-29 RX ORDER — DICYCLOMINE HYDROCHLORIDE 10 MG/1
TAKE 1 CAPSULE BY MOUTH 3 TIMES A DAY CAPSULE ORAL
Qty: 270 CAPSULE | Refills: 1 | Status: ACTIVE | COMMUNITY

## 2023-11-29 RX ORDER — USTEKINUMAB 130 MG/26ML
AS DIRECTED SOLUTION INTRAVENOUS ONCE
OUTPATIENT
Start: 2023-11-29 | End: 2023-11-30

## 2023-11-29 RX ORDER — PREDNISONE 20 MG/1
TAKE 2 TABLETS BY MOUTH FOR 7 DAYS. TAKE ONE AND ONE-HALF TABLETS BY MOUTH FOR 1 WEEK. TAKE 1 TABLET BY MOUTH FOR 1 WEEK. THEN TAKE ONE-HALF TABLET BY MOUTH FOR 1 WEEK AND STOP TABLET ORAL
Qty: 35 TABLET | Refills: 0 | Status: ACTIVE | COMMUNITY

## 2023-11-29 RX ORDER — AMITRIPTYLINE HYDROCHLORIDE 10 MG/1
TAKE 1 TABLET BY MOUTH EVERYDAY AT BEDTIME TABLET, FILM COATED ORAL
Qty: 90 TABLETS | Refills: 3 | Status: ON HOLD | COMMUNITY

## 2023-11-29 RX ORDER — COLCHICINE 0.6 MG/1
1 CAPSULE CAPSULE ORAL
Status: ACTIVE | COMMUNITY

## 2023-11-29 RX ORDER — CHOLESTYRAMINE POWDER FOR SUSPENSION 4 G/8.78G
TAKE 1 SCOOP ORALLY EVERY DAY FOR 30 DAYS POWDER, FOR SUSPENSION ORAL
Qty: 30 PACKET | Refills: 6 | Status: ACTIVE | COMMUNITY

## 2023-11-29 RX ORDER — USTEKINUMAB 90 MG/ML
PRE-FILLED SYRINGE INJECTION, SOLUTION SUBCUTANEOUS
Qty: 1 | Refills: 11 | OUTPATIENT
Start: 2023-11-29 | End: 2025-10-01

## 2023-11-29 RX ORDER — KRILL/OM-3/DHA/EPA/PHOSPHO/AST 1000-230MG
1 TABLET CAPSULE ORAL ONCE A DAY
Status: ACTIVE | COMMUNITY

## 2023-11-29 RX ORDER — HYOSCYAMINE SULFATE 0.38 MG/1
TAKE 1 TABLET (0.375 MG) BY ORAL ROUTE EVERY 12 HOURS FOR 30 DAYS TABLET ORAL
Qty: 60 TABLETS | Refills: 5 | Status: ON HOLD | COMMUNITY

## 2023-11-29 RX ORDER — LOSARTAN POTASSIUM AND HYDROCHLOROTHIAZIDE 100; 12.5 MG/1; MG/1
TAKE 1 TABLET BY ORAL ROUTE ONCE DAILY TABLET, FILM COATED ORAL 1
Qty: 0 | Refills: 0 | Status: ACTIVE | COMMUNITY
Start: 1900-01-01

## 2023-11-29 RX ORDER — PANTOPRAZOLE SODIUM 40 MG/1
1 TABLET TABLET, DELAYED RELEASE ORAL ONCE A DAY
Status: ON HOLD | COMMUNITY

## 2023-11-29 RX ORDER — SULFASALAZINE 500 MG/1
1 TABLET TABLET ORAL THREE TIMES A DAY
Qty: 90 TABLET | Refills: 11 | Status: ACTIVE | COMMUNITY
Start: 2023-04-27 | End: 2024-04-21

## 2023-11-29 RX ORDER — PREDNISONE 20 MG/1
1 TABLET TABLET ORAL ONCE A DAY
Qty: 30 | Refills: 3 | OUTPATIENT
Start: 2023-11-29 | End: 2024-03-28

## 2023-11-29 RX ORDER — ADALIMUMAB 40MG/0.4ML
0.4 ML KIT SUBCUTANEOUS EVERY OTHER WEEK
Status: ON HOLD | COMMUNITY

## 2023-11-29 RX ORDER — COLESTIPOL HYDROCHLORIDE 1 G/1
TAKE 2 TABLETS BY MOUTH EVERY DAY FOR 30 DAYS TABLET, FILM COATED ORAL
Qty: 60 TABLET | Refills: 6 | Status: ACTIVE | COMMUNITY

## 2023-11-29 RX ORDER — LEVOTHYROXINE SODIUM 25 UG/1
TABLET ORAL
Qty: 0 | Refills: 0 | Status: ACTIVE | COMMUNITY
Start: 1900-01-01

## 2023-11-29 RX ORDER — PREDNISONE 5 MG/1
1 TABLET TABLET ORAL ONCE A DAY
Status: ACTIVE | COMMUNITY

## 2023-11-29 RX ORDER — BALSALAZIDE DISODIUM 750 MG/1
2 PO BID CAPSULE ORAL TWICE A DAY
Qty: 360 CAPSULES | Refills: 3 | Status: ON HOLD | COMMUNITY

## 2023-11-29 NOTE — HPI-TODAY'S VISIT:
Ms. Espitia returns for follow-up of nonspecific ileitis and colitis, possibly Crohn's versus UC. she had a flex sig, which seemed more consistent with crohns. she was given pred course and placed on sulfasalazine. unfortunately, this stopped controlling her symptoms. she is interested in consider stelara as it may cover her crohns and RA. sb 4/27/23 Flex sig with erosions asced, desced, and transverse- path crohns vs UC 1/2022 Normal colonoscopy with normal random bx.  Colonoscopy 7/2020 with congested mucosa in the recto-sigmoid colon, path with questionable colitis, nonspecific.  EGD 1/29/20 normal EGD; gastric bx negative for H. pylori, esophageal bx c/w lymphocytic esophagitis (reflux related). Colonoscopy 12/2016-normal colon; single ulcer in the distal ileum with bx focally ulcerated TI, no granulomas.

## 2023-11-30 ENCOUNTER — TELEPHONE ENCOUNTER (OUTPATIENT)
Dept: URBAN - NONMETROPOLITAN AREA CLINIC 2 | Facility: CLINIC | Age: 73
End: 2023-11-30

## 2023-12-02 LAB
A/G RATIO: 1.3
ABSOLUTE BASOPHILS: 19
ABSOLUTE EOSINOPHILS: 19
ABSOLUTE LYMPHOCYTES: 1805
ABSOLUTE MONOCYTES: 389
ABSOLUTE NEUTROPHILS: 2568
ALBUMIN: 4
ALKALINE PHOSPHATASE: 19
ALT (SGPT): 19
AST (SGOT): 21
BASOPHILS: 0.4
BILIRUBIN, TOTAL: 0.5
BUN/CREATININE RATIO: (no result)
BUN: 13
C-REACTIVE PROTEIN, QUANT: 3.5
CALCIUM: 9.3
CARBON DIOXIDE, TOTAL: 29
CHLORIDE: 106
CREATININE: 0.93
EGFR: 64
EOSINOPHILS: 0.4
GLOBULIN, TOTAL: 3
GLUCOSE: 87
HEMATOCRIT: 36.9
HEMOGLOBIN: 11.9
LYMPHOCYTES: 37.6
MCH: 29.1
MCHC: 32.2
MCV: 90.2
MITOGEN-NIL: 8.96
MONOCYTES: 8.1
MPV: 12.7
NEUTROPHILS: 53.5
PLATELET COUNT: 198
POTASSIUM: 3.3
PROTEIN, TOTAL: 7
QUANTIFERON NIL VALUE: 0.02
QUANTIFERON TB1 AG VALUE: 0.23
QUANTIFERON TB2 AG VALUE: 0.09
QUANTIFERON-TB GOLD PLUS: NEGATIVE
RDW: 13.8
RED BLOOD CELL COUNT: 4.09
SED RATE BY MODIFIED: 22
SODIUM: 144
WHITE BLOOD CELL COUNT: 4.8

## 2023-12-04 ENCOUNTER — TELEPHONE ENCOUNTER (OUTPATIENT)
Dept: URBAN - NONMETROPOLITAN AREA CLINIC 2 | Facility: CLINIC | Age: 73
End: 2023-12-04

## 2023-12-04 LAB
HBSAG SCREEN: (no result)
HEP A AB, IGM: (no result)
HEP B CORE AB, IGM: (no result)
HEPATITIS C ANTIBODY: (no result)

## 2023-12-19 ENCOUNTER — TELEPHONE ENCOUNTER (OUTPATIENT)
Dept: URBAN - NONMETROPOLITAN AREA CLINIC 1 | Facility: CLINIC | Age: 73
End: 2023-12-19

## 2023-12-19 ENCOUNTER — OFFICE VISIT (OUTPATIENT)
Dept: URBAN - NONMETROPOLITAN AREA CLINIC 1 | Facility: CLINIC | Age: 73
End: 2023-12-19
Payer: MEDICARE

## 2023-12-19 VITALS
SYSTOLIC BLOOD PRESSURE: 165 MMHG | TEMPERATURE: 97.9 F | BODY MASS INDEX: 33.91 KG/M2 | WEIGHT: 211 LBS | DIASTOLIC BLOOD PRESSURE: 91 MMHG | HEIGHT: 66 IN

## 2023-12-19 DIAGNOSIS — K50.80 CROHN'S COLITIS: ICD-10-CM

## 2023-12-19 PROCEDURE — 96365 THER/PROPH/DIAG IV INF INIT: CPT | Performed by: INTERNAL MEDICINE

## 2023-12-19 RX ORDER — LEVOTHYROXINE SODIUM 25 UG/1
TABLET ORAL
Qty: 0 | Refills: 0 | Status: ACTIVE | COMMUNITY
Start: 1900-01-01

## 2023-12-19 RX ORDER — COLCHICINE 0.6 MG/1
1 CAPSULE CAPSULE ORAL
Status: ACTIVE | COMMUNITY

## 2023-12-19 RX ORDER — AMITRIPTYLINE HYDROCHLORIDE 10 MG/1
TAKE 1 TABLET BY MOUTH EVERYDAY AT BEDTIME TABLET, FILM COATED ORAL
Qty: 90 TABLETS | Refills: 3 | Status: ON HOLD | COMMUNITY

## 2023-12-19 RX ORDER — CHOLESTYRAMINE POWDER FOR SUSPENSION 4 G/8.78G
TAKE 1 SCOOP ORALLY EVERY DAY FOR 30 DAYS POWDER, FOR SUSPENSION ORAL
Qty: 30 PACKET | Refills: 6 | Status: ACTIVE | COMMUNITY

## 2023-12-19 RX ORDER — DICYCLOMINE HYDROCHLORIDE 10 MG/1
TAKE 1 CAPSULE BY MOUTH 3 TIMES A DAY CAPSULE ORAL
Qty: 270 CAPSULE | Refills: 1 | Status: ACTIVE | COMMUNITY

## 2023-12-19 RX ORDER — PREDNISONE 5 MG/1
1 TABLET TABLET ORAL ONCE A DAY
Status: ACTIVE | COMMUNITY

## 2023-12-19 RX ORDER — PANTOPRAZOLE SODIUM 40 MG/1
1 TABLET TABLET, DELAYED RELEASE ORAL ONCE A DAY
Status: ON HOLD | COMMUNITY

## 2023-12-19 RX ORDER — KRILL/OM-3/DHA/EPA/PHOSPHO/AST 1000-230MG
1 TABLET CAPSULE ORAL ONCE A DAY
Status: ACTIVE | COMMUNITY

## 2023-12-19 RX ORDER — PREDNISONE 20 MG/1
1 TABLET TABLET ORAL ONCE A DAY
Qty: 30 | Refills: 3 | Status: ACTIVE | COMMUNITY
Start: 2023-11-29 | End: 2024-03-28

## 2023-12-19 RX ORDER — PREDNISONE 20 MG/1
TAKE 2 TABLETS BY MOUTH FOR 7 DAYS. TAKE ONE AND ONE-HALF TABLETS BY MOUTH FOR 1 WEEK. TAKE 1 TABLET BY MOUTH FOR 1 WEEK. THEN TAKE ONE-HALF TABLET BY MOUTH FOR 1 WEEK AND STOP TABLET ORAL
Qty: 35 TABLET | Refills: 0 | Status: ACTIVE | COMMUNITY

## 2023-12-19 RX ORDER — ADALIMUMAB 40MG/0.4ML
0.4 ML KIT SUBCUTANEOUS EVERY OTHER WEEK
Status: ON HOLD | COMMUNITY

## 2023-12-19 RX ORDER — LOSARTAN POTASSIUM AND HYDROCHLOROTHIAZIDE 100; 12.5 MG/1; MG/1
TAKE 1 TABLET BY ORAL ROUTE ONCE DAILY TABLET, FILM COATED ORAL 1
Qty: 0 | Refills: 0 | Status: ACTIVE | COMMUNITY
Start: 1900-01-01

## 2023-12-19 RX ORDER — HYOSCYAMINE SULFATE 0.38 MG/1
TAKE 1 TABLET (0.375 MG) BY ORAL ROUTE EVERY 12 HOURS FOR 30 DAYS TABLET ORAL
Qty: 60 TABLETS | Refills: 5 | Status: ON HOLD | COMMUNITY

## 2023-12-19 RX ORDER — BALSALAZIDE DISODIUM 750 MG/1
2 PO BID CAPSULE ORAL TWICE A DAY
Qty: 360 CAPSULES | Refills: 3 | Status: ON HOLD | COMMUNITY

## 2023-12-19 RX ORDER — SULFASALAZINE 500 MG/1
1 TABLET TABLET ORAL THREE TIMES A DAY
Qty: 90 TABLET | Refills: 11 | Status: ACTIVE | COMMUNITY
Start: 2023-04-27 | End: 2024-04-21

## 2023-12-19 RX ORDER — COLESTIPOL HYDROCHLORIDE 1 G/1
TAKE 2 TABLETS BY MOUTH EVERY DAY FOR 30 DAYS TABLET, FILM COATED ORAL
Qty: 60 TABLET | Refills: 6 | Status: ACTIVE | COMMUNITY

## 2023-12-19 RX ORDER — USTEKINUMAB 90 MG/ML
PRE-FILLED SYRINGE INJECTION, SOLUTION SUBCUTANEOUS
Qty: 1 | Refills: 11 | Status: ACTIVE | COMMUNITY
Start: 2023-11-29 | End: 2025-10-01

## 2024-02-27 ENCOUNTER — OV EP (OUTPATIENT)
Dept: URBAN - NONMETROPOLITAN AREA CLINIC 2 | Facility: CLINIC | Age: 74
End: 2024-02-27
Payer: MEDICARE

## 2024-02-27 VITALS
TEMPERATURE: 97.1 F | HEART RATE: 106 BPM | HEIGHT: 66 IN | DIASTOLIC BLOOD PRESSURE: 77 MMHG | SYSTOLIC BLOOD PRESSURE: 127 MMHG | WEIGHT: 211 LBS | BODY MASS INDEX: 33.91 KG/M2

## 2024-02-27 DIAGNOSIS — R19.5 LOOSE STOOLS: ICD-10-CM

## 2024-02-27 DIAGNOSIS — Z12.11 COLON CANCER SCREENING: ICD-10-CM

## 2024-02-27 DIAGNOSIS — K50.10 CROHN'S DISEASE OF LARGE INTESTINE WITHOUT COMPLICATION: ICD-10-CM

## 2024-02-27 PROCEDURE — 99214 OFFICE O/P EST MOD 30 MIN: CPT | Performed by: NURSE PRACTITIONER

## 2024-02-27 RX ORDER — CHOLESTYRAMINE POWDER FOR SUSPENSION 4 G/8.78G
TAKE 1 SCOOP ORALLY EVERY DAY FOR 30 DAYS POWDER, FOR SUSPENSION ORAL
Qty: 30 PACKET | Refills: 6 | Status: ACTIVE | COMMUNITY

## 2024-02-27 RX ORDER — BUDESONIDE 2 MG/1
1 APPLICATION AEROSOL, FOAM RECTAL TWICE A DAY
Qty: 33.4 | Refills: 11 | OUTPATIENT
Start: 2024-02-27

## 2024-02-27 RX ORDER — COLESTIPOL HYDROCHLORIDE 1 G/1
TAKE 2 TABLETS BY MOUTH EVERY DAY FOR 30 DAYS TABLET, FILM COATED ORAL
Qty: 60 TABLET | Refills: 6 | Status: ACTIVE | COMMUNITY

## 2024-02-27 RX ORDER — DICYCLOMINE HYDROCHLORIDE 10 MG/1
TAKE 1 CAPSULE BY MOUTH 3 TIMES A DAY CAPSULE ORAL
Qty: 270 CAPSULE | Refills: 1 | Status: ACTIVE | COMMUNITY

## 2024-02-27 RX ORDER — PREDNISONE 20 MG/1
TAKE 1 TABLET EVERY DAY TABLET ORAL
Qty: 90 TABLET | Refills: 3 | Status: ACTIVE | COMMUNITY

## 2024-02-27 RX ORDER — PREDNISONE 5 MG/1
1 TABLET TABLET ORAL ONCE A DAY
Status: ACTIVE | COMMUNITY

## 2024-02-27 RX ORDER — ADALIMUMAB 40MG/0.4ML
0.4 ML KIT SUBCUTANEOUS EVERY OTHER WEEK
Status: ON HOLD | COMMUNITY

## 2024-02-27 RX ORDER — SULFASALAZINE 500 MG/1
1 TABLET TABLET ORAL THREE TIMES A DAY
Qty: 90 TABLET | Refills: 11 | Status: ACTIVE | COMMUNITY
Start: 2023-04-27 | End: 2024-04-21

## 2024-02-27 RX ORDER — BALSALAZIDE DISODIUM 750 MG/1
2 PO BID CAPSULE ORAL TWICE A DAY
Qty: 360 CAPSULES | Refills: 3 | Status: ON HOLD | COMMUNITY

## 2024-02-27 RX ORDER — LEVOTHYROXINE SODIUM 25 UG/1
TABLET ORAL
Qty: 0 | Refills: 0 | Status: ACTIVE | COMMUNITY
Start: 1900-01-01

## 2024-02-27 RX ORDER — USTEKINUMAB 90 MG/ML
PRE-FILLED SYRINGE INJECTION, SOLUTION SUBCUTANEOUS
Qty: 1 | Refills: 11 | Status: ACTIVE | COMMUNITY
Start: 2023-11-29 | End: 2025-10-01

## 2024-02-27 RX ORDER — LOSARTAN POTASSIUM AND HYDROCHLOROTHIAZIDE 100; 12.5 MG/1; MG/1
TAKE 1 TABLET BY ORAL ROUTE ONCE DAILY TABLET, FILM COATED ORAL 1
Qty: 0 | Refills: 0 | Status: ACTIVE | COMMUNITY
Start: 1900-01-01

## 2024-02-27 RX ORDER — COLCHICINE 0.6 MG/1
1 CAPSULE CAPSULE ORAL
Status: ACTIVE | COMMUNITY

## 2024-02-27 RX ORDER — KRILL/OM-3/DHA/EPA/PHOSPHO/AST 1000-230MG
1 TABLET CAPSULE ORAL ONCE A DAY
Status: ACTIVE | COMMUNITY

## 2024-02-27 RX ORDER — AMITRIPTYLINE HYDROCHLORIDE 10 MG/1
TAKE 1 TABLET BY MOUTH EVERYDAY AT BEDTIME TABLET, FILM COATED ORAL
Qty: 90 TABLETS | Refills: 3 | Status: ON HOLD | COMMUNITY

## 2024-02-27 RX ORDER — HYOSCYAMINE SULFATE 0.38 MG/1
TAKE 1 TABLET (0.375 MG) BY ORAL ROUTE EVERY 12 HOURS FOR 30 DAYS TABLET ORAL
Qty: 60 TABLETS | Refills: 5 | Status: ON HOLD | COMMUNITY

## 2024-02-27 RX ORDER — USTEKINUMAB 90 MG/ML
PRE-FILLED SYRINGE INJECTION, SOLUTION SUBCUTANEOUS
Qty: 1 | Refills: 11 | OUTPATIENT

## 2024-02-27 RX ORDER — PANTOPRAZOLE SODIUM 40 MG/1
1 TABLET TABLET, DELAYED RELEASE ORAL ONCE A DAY
Status: ON HOLD | COMMUNITY

## 2024-02-27 NOTE — HPI-TODAY'S VISIT:
Ms. Espitia returns for follow-up of nonspecific ileitis and colitis, possibly Crohn's versus UC. she had a flex sig, which seemed more consistent with crohns. she was given pred course and placed on sulfasalazine. unfortunately, this stopped controlling her symptoms. she is now on stelara and continues having loose stools in am. sb 4/27/23 Flex sig with erosions asced, desced, and transverse- path crohns vs UC 1/2022 Normal colonoscopy with normal random bx.  Colonoscopy 7/2020 with congested mucosa in the recto-sigmoid colon, path with questionable colitis, nonspecific.  EGD 1/29/20 normal EGD; gastric bx negative for H. pylori, esophageal bx c/w lymphocytic esophagitis (reflux related). Colonoscopy 12/2016-normal colon; single ulcer in the distal ileum with bx focally ulcerated TI, no granulomas.

## 2024-06-12 ENCOUNTER — OFFICE VISIT (OUTPATIENT)
Dept: URBAN - NONMETROPOLITAN AREA CLINIC 2 | Facility: CLINIC | Age: 74
End: 2024-06-12
Payer: MEDICARE

## 2024-06-12 VITALS
BODY MASS INDEX: 32.95 KG/M2 | HEART RATE: 101 BPM | WEIGHT: 205 LBS | SYSTOLIC BLOOD PRESSURE: 117 MMHG | DIASTOLIC BLOOD PRESSURE: 81 MMHG | HEIGHT: 66 IN

## 2024-06-12 DIAGNOSIS — K50.10 CROHN'S DISEASE OF LARGE INTESTINE WITHOUT COMPLICATION: ICD-10-CM

## 2024-06-12 DIAGNOSIS — Z12.11 COLON CANCER SCREENING: ICD-10-CM

## 2024-06-12 DIAGNOSIS — R19.5 LOOSE STOOLS: ICD-10-CM

## 2024-06-12 PROCEDURE — 99214 OFFICE O/P EST MOD 30 MIN: CPT | Performed by: INTERNAL MEDICINE

## 2024-06-12 RX ORDER — LEVOTHYROXINE SODIUM 25 UG/1
TABLET ORAL
Qty: 0 | Refills: 0 | Status: ACTIVE | COMMUNITY
Start: 1900-01-01

## 2024-06-12 RX ORDER — PANTOPRAZOLE SODIUM 40 MG/1
1 TABLET TABLET, DELAYED RELEASE ORAL ONCE A DAY
Status: ACTIVE | COMMUNITY

## 2024-06-12 RX ORDER — PREDNISONE 5 MG/1
1 TABLET TABLET ORAL ONCE A DAY
Status: ACTIVE | COMMUNITY

## 2024-06-12 RX ORDER — CHOLESTYRAMINE POWDER FOR SUSPENSION 4 G/8.78G
TAKE 1 SCOOP ORALLY EVERY DAY FOR 30 DAYS POWDER, FOR SUSPENSION ORAL
Qty: 30 PACKET | Refills: 6 | Status: ACTIVE | COMMUNITY

## 2024-06-12 RX ORDER — COLESTIPOL HYDROCHLORIDE 1 G/1
TAKE 2 TABLETS BY MOUTH EVERY DAY FOR 30 DAYS TABLET, FILM COATED ORAL
Qty: 60 TABLET | Refills: 6 | Status: ACTIVE | COMMUNITY

## 2024-06-12 RX ORDER — AMITRIPTYLINE HYDROCHLORIDE 10 MG/1
TAKE 1 TABLET BY MOUTH EVERYDAY AT BEDTIME TABLET, FILM COATED ORAL
Qty: 90 TABLETS | Refills: 3 | Status: ACTIVE | COMMUNITY

## 2024-06-12 RX ORDER — LOPERAMIDE HCL 2 MG/1
1 TABLET AS NEEDED TABLET, FILM COATED ORAL
Qty: 240 TABLET | Refills: 3 | OUTPATIENT
Start: 2024-06-12 | End: 2025-02-06

## 2024-06-12 RX ORDER — DICYCLOMINE HYDROCHLORIDE 10 MG/1
TAKE 1 CAPSULE BY MOUTH 3 TIMES A DAY CAPSULE ORAL
Qty: 270 CAPSULE | Refills: 1 | Status: ACTIVE | COMMUNITY

## 2024-06-12 RX ORDER — ADALIMUMAB 40MG/0.4ML
0.4 ML KIT SUBCUTANEOUS EVERY OTHER WEEK
Status: ACTIVE | COMMUNITY

## 2024-06-12 RX ORDER — LOSARTAN POTASSIUM AND HYDROCHLOROTHIAZIDE 100; 12.5 MG/1; MG/1
TAKE 1 TABLET BY ORAL ROUTE ONCE DAILY TABLET, FILM COATED ORAL 1
Qty: 0 | Refills: 0 | Status: ACTIVE | COMMUNITY
Start: 1900-01-01

## 2024-06-12 RX ORDER — BALSALAZIDE DISODIUM 750 MG/1
2 PO BID CAPSULE ORAL TWICE A DAY
Qty: 360 CAPSULES | Refills: 3 | Status: ACTIVE | COMMUNITY

## 2024-06-12 RX ORDER — KRILL/OM-3/DHA/EPA/PHOSPHO/AST 1000-230MG
1 TABLET CAPSULE ORAL ONCE A DAY
Status: ACTIVE | COMMUNITY

## 2024-06-12 RX ORDER — BUDESONIDE 2 MG/1
1 APPLICATION AEROSOL, FOAM RECTAL TWICE A DAY
Qty: 33.4 | Refills: 11 | Status: ACTIVE | COMMUNITY
Start: 2024-02-27

## 2024-06-12 RX ORDER — PREDNISONE 20 MG/1
TAKE 1 TABLET EVERY DAY TABLET ORAL
Qty: 90 TABLET | Refills: 3 | Status: ACTIVE | COMMUNITY

## 2024-06-12 RX ORDER — HYOSCYAMINE SULFATE 0.38 MG/1
TAKE 1 TABLET (0.375 MG) BY ORAL ROUTE EVERY 12 HOURS FOR 30 DAYS TABLET ORAL
Qty: 60 TABLETS | Refills: 5 | Status: ACTIVE | COMMUNITY

## 2024-06-12 RX ORDER — USTEKINUMAB 90 MG/ML
PRE-FILLED SYRINGE INJECTION, SOLUTION SUBCUTANEOUS
Qty: 1 | Refills: 11 | Status: ACTIVE | COMMUNITY

## 2024-06-12 RX ORDER — COLCHICINE 0.6 MG/1
1 CAPSULE CAPSULE ORAL
Status: ACTIVE | COMMUNITY

## 2024-06-12 NOTE — HPI-TODAY'S VISIT:
6/12/24: Ms. 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  She has been on Humira and most recently Stelara.  She reports she continues to have a lot of diarrhea in the AM, watery appearance.  She has not noticed much difference since starting Stelara.  She has been on it a few months now, she is finished with her infusions and injections now.    2/27/24: Ms. Espitia returns for follow-up of nonspecific ileitis and colitis, possibly Crohn's versus UC. she had a flex sig, which seemed more consistent with crohns. she was given pred course and placed on sulfasalazine. unfortunately, this stopped controlling her symptoms. she is now on stelara and continues having loose stools in am. sb 4/27/23 Flex sig with erosions asced, desced, and transverse- path crohns vs UC 1/2022 Normal colonoscopy with normal random bx.  Colonoscopy 7/2020 with congested mucosa in the recto-sigmoid colon, path with questionable colitis, nonspecific.  EGD 1/29/20 normal EGD; gastric bx negative for H. pylori, esophageal bx c/w lymphocytic esophagitis (reflux related). Colonoscopy 12/2016-normal colon; single ulcer in the distal ileum with bx focally ulcerated TI, no granulomas.

## 2024-06-13 ENCOUNTER — LAB OUTSIDE AN ENCOUNTER (OUTPATIENT)
Dept: URBAN - NONMETROPOLITAN AREA CLINIC 2 | Facility: CLINIC | Age: 74
End: 2024-06-13

## 2024-06-16 ENCOUNTER — TELEPHONE ENCOUNTER (OUTPATIENT)
Dept: URBAN - NONMETROPOLITAN AREA CLINIC 2 | Facility: CLINIC | Age: 74
End: 2024-06-16

## 2024-06-16 RX ORDER — VANCOMYCIN HYDROCHLORIDE 125 MG/1
1 CAPSULE CAPSULE ORAL
Qty: 40 CAPSULE | Refills: 0 | OUTPATIENT
Start: 2024-06-18 | End: 2024-06-28

## 2024-06-18 LAB
ADENOVIRUS F 40/41: NOT DETECTED
C. DIFFICILE TOXIN A/B, STOOL - QDX: NEGATIVE
CALPROTECTIN, STOOL - QDX: (no result)
CAMPYLOBACTER: NOT DETECTED
CLOSTRIDIUM DIFFICILE: DETECTED
ENTAMOEBA HISTOLYTICA: NOT DETECTED
ENTEROAGGREGATIVE E.COLI: NOT DETECTED
ENTEROTOXIGENIC E.COLI: NOT DETECTED
ESCHERICHIA COLI O157: NOT DETECTED
GIARDIA LAMBLIA: NOT DETECTED
NOROVIRUS GI/GII: NOT DETECTED
PANCREATICELASTASE ELISA, STOOL: (no result)
ROTAVIRUS A: NOT DETECTED
SALMONELLA SPP.: NOT DETECTED
SHIGA-LIKE TOXIN PRODUCING E.COLI: NOT DETECTED
SHIGELLA SPP. / ENTEROINVASIVE E.COLI: NOT DETECTED
VIBRIO PARAHAEMOLYTICUS: NOT DETECTED
VIBRIO SPP.: NOT DETECTED
YERSINIA ENTEROCOLITICA: NOT DETECTED

## 2024-09-12 ENCOUNTER — OFFICE VISIT (OUTPATIENT)
Dept: URBAN - NONMETROPOLITAN AREA CLINIC 2 | Facility: CLINIC | Age: 74
End: 2024-09-12
Payer: MEDICARE

## 2024-09-12 VITALS
DIASTOLIC BLOOD PRESSURE: 76 MMHG | TEMPERATURE: 97.1 F | WEIGHT: 202 LBS | HEIGHT: 66 IN | HEART RATE: 87 BPM | SYSTOLIC BLOOD PRESSURE: 117 MMHG | BODY MASS INDEX: 32.47 KG/M2

## 2024-09-12 DIAGNOSIS — Z12.11 COLON CANCER SCREENING: ICD-10-CM

## 2024-09-12 DIAGNOSIS — K50.10 CROHN'S DISEASE OF LARGE INTESTINE WITHOUT COMPLICATION: ICD-10-CM

## 2024-09-12 PROCEDURE — 99214 OFFICE O/P EST MOD 30 MIN: CPT | Performed by: NURSE PRACTITIONER

## 2024-09-12 RX ORDER — HYOSCYAMINE SULFATE 0.38 MG/1
TAKE 1 TABLET (0.375 MG) BY ORAL ROUTE EVERY 12 HOURS FOR 30 DAYS TABLET ORAL
Qty: 60 TABLETS | Refills: 5 | Status: ACTIVE | COMMUNITY

## 2024-09-12 RX ORDER — BUDESONIDE 2 MG/1
1 APPLICATION AEROSOL, FOAM RECTAL TWICE A DAY
Qty: 33.4 | Refills: 11 | Status: ACTIVE | COMMUNITY
Start: 2024-02-27

## 2024-09-12 RX ORDER — ADALIMUMAB 40MG/0.4ML
0.4 ML KIT SUBCUTANEOUS EVERY OTHER WEEK
Status: ACTIVE | COMMUNITY

## 2024-09-12 RX ORDER — KRILL/OM-3/DHA/EPA/PHOSPHO/AST 1000-230MG
1 TABLET CAPSULE ORAL ONCE A DAY
Status: ACTIVE | COMMUNITY

## 2024-09-12 RX ORDER — USTEKINUMAB 90 MG/ML
PRE-FILLED SYRINGE INJECTION, SOLUTION SUBCUTANEOUS
Qty: 1 | Refills: 11 | Status: ACTIVE | COMMUNITY

## 2024-09-12 RX ORDER — AMITRIPTYLINE HYDROCHLORIDE 10 MG/1
TAKE 1 TABLET BY MOUTH EVERYDAY AT BEDTIME TABLET, FILM COATED ORAL
Qty: 90 TABLETS | Refills: 3 | Status: ACTIVE | COMMUNITY

## 2024-09-12 RX ORDER — COLCHICINE 0.6 MG/1
1 CAPSULE CAPSULE ORAL
Status: ACTIVE | COMMUNITY

## 2024-09-12 RX ORDER — COLESTIPOL HYDROCHLORIDE 1 G/1
TAKE 2 TABLETS BY MOUTH EVERY DAY FOR 30 DAYS TABLET, FILM COATED ORAL
Qty: 60 TABLET | Refills: 6 | Status: ACTIVE | COMMUNITY

## 2024-09-12 RX ORDER — LOPERAMIDE HCL 2 MG/1
1 TABLET AS NEEDED TABLET, FILM COATED ORAL
Qty: 240 TABLET | Refills: 3 | Status: ACTIVE | COMMUNITY
Start: 2024-06-12 | End: 2025-02-06

## 2024-09-12 RX ORDER — CHOLESTYRAMINE POWDER FOR SUSPENSION 4 G/8.78G
TAKE 1 SCOOP ORALLY EVERY DAY FOR 30 DAYS POWDER, FOR SUSPENSION ORAL
Qty: 30 PACKET | Refills: 6 | Status: ACTIVE | COMMUNITY

## 2024-09-12 RX ORDER — LOSARTAN POTASSIUM AND HYDROCHLOROTHIAZIDE 100; 12.5 MG/1; MG/1
TAKE 1 TABLET BY ORAL ROUTE ONCE DAILY TABLET, FILM COATED ORAL 1
Qty: 0 | Refills: 0 | Status: ACTIVE | COMMUNITY
Start: 1900-01-01

## 2024-09-12 RX ORDER — LEVOTHYROXINE SODIUM 25 UG/1
TABLET ORAL
Qty: 0 | Refills: 0 | Status: ACTIVE | COMMUNITY
Start: 1900-01-01

## 2024-09-12 RX ORDER — PREDNISONE 20 MG/1
TAKE 1 TABLET EVERY DAY TABLET ORAL
Qty: 90 TABLET | Refills: 3 | Status: ACTIVE | COMMUNITY

## 2024-09-12 RX ORDER — BALSALAZIDE DISODIUM 750 MG/1
2 PO BID CAPSULE ORAL TWICE A DAY
Qty: 360 CAPSULES | Refills: 3 | Status: ACTIVE | COMMUNITY

## 2024-09-12 RX ORDER — LOPERAMIDE HCL 2 MG/1
1 TABLET AS NEEDED TABLET, FILM COATED ORAL
Qty: 240 TABLET | Refills: 3 | OUTPATIENT

## 2024-09-12 RX ORDER — PANTOPRAZOLE SODIUM 40 MG/1
1 TABLET TABLET, DELAYED RELEASE ORAL ONCE A DAY
Status: ACTIVE | COMMUNITY

## 2024-09-12 RX ORDER — DICYCLOMINE HYDROCHLORIDE 10 MG/1
TAKE 1 CAPSULE BY MOUTH 3 TIMES A DAY CAPSULE ORAL
Qty: 270 CAPSULE | Refills: 1 | Status: ACTIVE | COMMUNITY

## 2024-09-12 RX ORDER — PREDNISONE 5 MG/1
1 TABLET TABLET ORAL ONCE A DAY
Status: ACTIVE | COMMUNITY

## 2024-09-12 NOTE — HPI-TODAY'S VISIT:
9/12/24 Ms. 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  She has been on Humira and most recently Stelara.  She hasn't noticed any change in symptoms on stelara. she is on injections q 8 weeks. at last visit, cdiff pcr +, but toxin negative and elevated calpro. tx with vanc. denies major change, but today seems like she is doing ok with prn imodium use. sb  2/27/24: Ms. Espitia returns for follow-up of nonspecific ileitis and colitis, possibly Crohn's versus UC. she had a flex sig, which seemed more consistent with crohns. she was given pred course and placed on sulfasalazine. unfortunately, this stopped controlling her symptoms. she is now on stelara and continues having loose stools in am. sb 4/27/23 Flex sig with erosions asced, desced, and transverse- path crohns vs UC 1/2022 Normal colonoscopy with normal random bx.  Colonoscopy 7/2020 with congested mucosa in the recto-sigmoid colon, path with questionable colitis, nonspecific.  EGD 1/29/20 normal EGD; gastric bx negative for H. pylori, esophageal bx c/w lymphocytic esophagitis (reflux related). Colonoscopy 12/2016-normal colon; single ulcer in the distal ileum with bx focally ulcerated TI, no granulomas.

## 2024-09-18 ENCOUNTER — TELEPHONE ENCOUNTER (OUTPATIENT)
Dept: URBAN - NONMETROPOLITAN AREA CLINIC 2 | Facility: CLINIC | Age: 74
End: 2024-09-18

## 2024-09-19 LAB
CALPROTECTIN, FECAL: 108
CLOSTRIDIUM DIFFICILE TOXINB,QL REAL TIME PCR: DETECTED
CLOSTRIDIUM DIFFICILE: (no result)

## 2024-09-20 ENCOUNTER — TELEPHONE ENCOUNTER (OUTPATIENT)
Dept: URBAN - NONMETROPOLITAN AREA CLINIC 2 | Facility: CLINIC | Age: 74
End: 2024-09-20

## 2024-12-02 ENCOUNTER — ERX REFILL RESPONSE (OUTPATIENT)
Dept: URBAN - NONMETROPOLITAN AREA CLINIC 2 | Facility: CLINIC | Age: 74
End: 2024-12-02

## 2024-12-02 RX ORDER — PREDNISONE 20 MG/1
TAKE 1 TABLET EVERY DAY TABLET ORAL
Qty: 90 TABLET | Refills: 3 | OUTPATIENT

## 2024-12-02 RX ORDER — PREDNISONE 20 MG/1
TAKE 1 TABLET EVERY DAY TABLET ORAL
Qty: 90 TABLET | Refills: 4 | OUTPATIENT

## 2024-12-13 ENCOUNTER — TELEPHONE ENCOUNTER (OUTPATIENT)
Dept: URBAN - NONMETROPOLITAN AREA CLINIC 2 | Facility: CLINIC | Age: 74
End: 2024-12-13

## 2024-12-13 ENCOUNTER — OFFICE VISIT (OUTPATIENT)
Dept: URBAN - NONMETROPOLITAN AREA CLINIC 2 | Facility: CLINIC | Age: 74
End: 2024-12-13
Payer: MEDICARE

## 2024-12-13 VITALS
HEIGHT: 66 IN | BODY MASS INDEX: 32.14 KG/M2 | DIASTOLIC BLOOD PRESSURE: 83 MMHG | HEART RATE: 80 BPM | SYSTOLIC BLOOD PRESSURE: 124 MMHG | WEIGHT: 200 LBS

## 2024-12-13 DIAGNOSIS — Z12.11 COLON CANCER SCREENING: ICD-10-CM

## 2024-12-13 DIAGNOSIS — R19.5 LOOSE STOOLS: ICD-10-CM

## 2024-12-13 DIAGNOSIS — K50.10 CROHN'S DISEASE OF LARGE INTESTINE WITHOUT COMPLICATION: ICD-10-CM

## 2024-12-13 PROCEDURE — 99214 OFFICE O/P EST MOD 30 MIN: CPT | Performed by: NURSE PRACTITIONER

## 2024-12-13 RX ORDER — KRILL/OM-3/DHA/EPA/PHOSPHO/AST 1000-230MG
1 TABLET CAPSULE ORAL ONCE A DAY
Status: ACTIVE | COMMUNITY

## 2024-12-13 RX ORDER — UPADACITINIB 45 MG/1
AS DIRECTED TABLET, EXTENDED RELEASE ORAL ONCE A DAY
Qty: 84 | Refills: 0 | OUTPATIENT
Start: 2024-12-13 | End: 2025-03-07

## 2024-12-13 RX ORDER — COLESTIPOL HYDROCHLORIDE 1 G/1
TAKE 2 TABLETS BY MOUTH EVERY DAY FOR 30 DAYS TABLET, FILM COATED ORAL
Qty: 60 TABLET | Refills: 6 | Status: ACTIVE | COMMUNITY

## 2024-12-13 RX ORDER — AMITRIPTYLINE HYDROCHLORIDE 10 MG/1
TAKE 1 TABLET BY MOUTH EVERYDAY AT BEDTIME TABLET, FILM COATED ORAL
Qty: 90 TABLETS | Refills: 3 | Status: ACTIVE | COMMUNITY

## 2024-12-13 RX ORDER — COLCHICINE 0.6 MG/1
1 CAPSULE CAPSULE ORAL
Status: ACTIVE | COMMUNITY

## 2024-12-13 RX ORDER — LEVOTHYROXINE SODIUM 25 UG/1
TABLET ORAL
Qty: 0 | Refills: 0 | Status: ACTIVE | COMMUNITY
Start: 1900-01-01

## 2024-12-13 RX ORDER — ADALIMUMAB 40MG/0.4ML
0.4 ML KIT SUBCUTANEOUS EVERY OTHER WEEK
Status: DISCONTINUED | COMMUNITY

## 2024-12-13 RX ORDER — USTEKINUMAB 90 MG/ML
PRE-FILLED SYRINGE INJECTION, SOLUTION SUBCUTANEOUS
Qty: 1 | Refills: 11 | Status: ACTIVE | COMMUNITY

## 2024-12-13 RX ORDER — UPADACITINIB 15 MG/1
1 TABLET TABLET, EXTENDED RELEASE ORAL ONCE A DAY
Qty: 90 TABLET | Refills: 3 | OUTPATIENT
Start: 2024-12-13 | End: 2025-12-08

## 2024-12-13 RX ORDER — PREDNISONE 20 MG/1
TAKE 1 TABLET EVERY DAY TABLET ORAL
Qty: 90 TABLET | Refills: 3 | Status: ACTIVE | COMMUNITY

## 2024-12-13 RX ORDER — BALSALAZIDE DISODIUM 750 MG/1
2 PO BID CAPSULE ORAL TWICE A DAY
Qty: 360 CAPSULES | Refills: 3 | Status: DISCONTINUED | COMMUNITY

## 2024-12-13 RX ORDER — LOPERAMIDE HCL 2 MG/1
1 TABLET AS NEEDED TABLET, FILM COATED ORAL
Qty: 240 TABLET | Refills: 3 | Status: ACTIVE | COMMUNITY

## 2024-12-13 RX ORDER — HYOSCYAMINE SULFATE 0.38 MG/1
TAKE 1 TABLET (0.375 MG) BY ORAL ROUTE EVERY 12 HOURS FOR 30 DAYS TABLET ORAL
Qty: 60 TABLETS | Refills: 5 | Status: ACTIVE | COMMUNITY

## 2024-12-13 RX ORDER — LOPERAMIDE HCL 2 MG/1
1 TABLET AS NEEDED TABLET, FILM COATED ORAL
Qty: 240 TABLET | Refills: 3 | OUTPATIENT

## 2024-12-13 RX ORDER — LOSARTAN POTASSIUM AND HYDROCHLOROTHIAZIDE 100; 12.5 MG/1; MG/1
TAKE 1 TABLET BY ORAL ROUTE ONCE DAILY TABLET, FILM COATED ORAL 1
Qty: 0 | Refills: 0 | Status: ACTIVE | COMMUNITY
Start: 1900-01-01

## 2024-12-13 RX ORDER — CHOLESTYRAMINE POWDER FOR SUSPENSION 4 G/8.78G
TAKE 1 SCOOP ORALLY EVERY DAY FOR 30 DAYS POWDER, FOR SUSPENSION ORAL
Qty: 30 PACKET | Refills: 6 | Status: ACTIVE | COMMUNITY

## 2024-12-13 RX ORDER — PANTOPRAZOLE SODIUM 40 MG/1
1 TABLET TABLET, DELAYED RELEASE ORAL ONCE A DAY
Status: ACTIVE | COMMUNITY

## 2024-12-13 RX ORDER — BUDESONIDE 2 MG/1
1 APPLICATION AEROSOL, FOAM RECTAL TWICE A DAY
Qty: 33.4 | Refills: 11 | Status: ACTIVE | COMMUNITY
Start: 2024-02-27

## 2024-12-13 NOTE — HPI-TODAY'S VISIT:
12/13/24  Ms. 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  She has been on Humira and most recently Stelara.  She hasn't noticed any change in symptoms on stelara. she is on injections q 8 weeks. at last visit, cdiff pcr +, but toxin negative and elevated calpro. tx with vanc.  still having diarrhea. repeat cdiff positive. tx with dificid. on stelara. feels like it isn't helping. uses imodium 1-2x weekly. sb  2/27/24: Ms. Espitia returns for follow-up of nonspecific ileitis and colitis, possibly Crohn's versus UC. she had a flex sig, which seemed more consistent with crohns. she was given pred course and placed on sulfasalazine. unfortunately, this stopped controlling her symptoms. she is now on stelara and continues having loose stools in am. sb 4/27/23 Flex sig with erosions asced, desced, and transverse- path crohns vs UC 1/2022 Normal colonoscopy with normal random bx.  Colonoscopy 7/2020 with congested mucosa in the recto-sigmoid colon, path with questionable colitis, nonspecific.  EGD 1/29/20 normal EGD; gastric bx negative for H. pylori, esophageal bx c/w lymphocytic esophagitis (reflux related). Colonoscopy 12/2016-normal colon; single ulcer in the distal ileum with bx focally ulcerated TI, no granulomas.

## 2024-12-16 ENCOUNTER — TELEPHONE ENCOUNTER (OUTPATIENT)
Dept: URBAN - NONMETROPOLITAN AREA CLINIC 2 | Facility: CLINIC | Age: 74
End: 2024-12-16

## 2024-12-23 ENCOUNTER — TELEPHONE ENCOUNTER (OUTPATIENT)
Dept: URBAN - NONMETROPOLITAN AREA CLINIC 2 | Facility: CLINIC | Age: 74
End: 2024-12-23

## 2024-12-23 LAB
CALPROTECTIN, FECAL: 665
CLOSTRIDIUM DIFFICILE: (no result)

## 2025-01-21 ENCOUNTER — TELEPHONE ENCOUNTER (OUTPATIENT)
Dept: URBAN - NONMETROPOLITAN AREA CLINIC 2 | Facility: CLINIC | Age: 75
End: 2025-01-21

## 2025-01-21 RX ORDER — HYDROCORTISONE 25 MG/G
1 APPLICATION CREAM TOPICAL TWICE A DAY
Qty: 1 CANISTER | Refills: 3 | OUTPATIENT
Start: 2025-01-21 | End: 2025-03-18

## 2025-01-23 ENCOUNTER — TELEPHONE ENCOUNTER (OUTPATIENT)
Dept: URBAN - NONMETROPOLITAN AREA CLINIC 2 | Facility: CLINIC | Age: 75
End: 2025-01-23

## 2025-02-25 ENCOUNTER — TELEPHONE ENCOUNTER (OUTPATIENT)
Dept: URBAN - NONMETROPOLITAN AREA CLINIC 2 | Facility: CLINIC | Age: 75
End: 2025-02-25

## 2025-03-07 ENCOUNTER — OFFICE VISIT (OUTPATIENT)
Dept: URBAN - NONMETROPOLITAN AREA CLINIC 2 | Facility: CLINIC | Age: 75
End: 2025-03-07
Payer: MEDICARE

## 2025-03-07 ENCOUNTER — TELEPHONE ENCOUNTER (OUTPATIENT)
Dept: URBAN - NONMETROPOLITAN AREA CLINIC 2 | Facility: CLINIC | Age: 75
End: 2025-03-07

## 2025-03-07 VITALS
BODY MASS INDEX: 31.66 KG/M2 | WEIGHT: 197 LBS | DIASTOLIC BLOOD PRESSURE: 82 MMHG | HEART RATE: 81 BPM | SYSTOLIC BLOOD PRESSURE: 120 MMHG | HEIGHT: 66 IN

## 2025-03-07 DIAGNOSIS — Z12.11 COLON CANCER SCREENING: ICD-10-CM

## 2025-03-07 DIAGNOSIS — R19.5 LOOSE STOOLS: ICD-10-CM

## 2025-03-07 DIAGNOSIS — K50.10 CROHN'S DISEASE OF LARGE INTESTINE WITHOUT COMPLICATION: ICD-10-CM

## 2025-03-07 PROCEDURE — 99214 OFFICE O/P EST MOD 30 MIN: CPT | Performed by: NURSE PRACTITIONER

## 2025-03-07 RX ORDER — BUDESONIDE 2 MG/1
1 APPLICATION AEROSOL, FOAM RECTAL TWICE A DAY
Qty: 33.4 | Refills: 11 | Status: ACTIVE | COMMUNITY
Start: 2024-02-27

## 2025-03-07 RX ORDER — LEVOTHYROXINE SODIUM 25 UG/1
TABLET ORAL
Qty: 0 | Refills: 0 | Status: ACTIVE | COMMUNITY
Start: 1900-01-01

## 2025-03-07 RX ORDER — HYDROCORTISONE 25 MG/G
1 APPLICATION CREAM TOPICAL TWICE A DAY
Qty: 1 CANISTER | Refills: 3 | Status: ACTIVE | COMMUNITY
Start: 2025-01-21 | End: 2025-03-18

## 2025-03-07 RX ORDER — CHOLESTYRAMINE POWDER FOR SUSPENSION 4 G/8.78G
TAKE 1 SCOOP ORALLY EVERY DAY FOR 30 DAYS POWDER, FOR SUSPENSION ORAL
Qty: 30 PACKET | Refills: 6 | Status: ACTIVE | COMMUNITY

## 2025-03-07 RX ORDER — HYOSCYAMINE SULFATE 0.38 MG/1
TAKE 1 TABLET (0.375 MG) BY ORAL ROUTE EVERY 12 HOURS FOR 30 DAYS TABLET ORAL
Qty: 60 TABLETS | Refills: 5 | Status: ACTIVE | COMMUNITY

## 2025-03-07 RX ORDER — PANTOPRAZOLE SODIUM 40 MG/1
1 TABLET TABLET, DELAYED RELEASE ORAL ONCE A DAY
Status: ACTIVE | COMMUNITY

## 2025-03-07 RX ORDER — LOPERAMIDE HCL 2 MG/1
1 TABLET AS NEEDED TABLET, FILM COATED ORAL
Qty: 240 TABLET | Refills: 3 | Status: ACTIVE | COMMUNITY

## 2025-03-07 RX ORDER — KRILL/OM-3/DHA/EPA/PHOSPHO/AST 1000-230MG
1 TABLET CAPSULE ORAL ONCE A DAY
Status: ACTIVE | COMMUNITY

## 2025-03-07 RX ORDER — LOPERAMIDE HCL 2 MG/1
1 TABLET AS NEEDED TABLET, FILM COATED ORAL
Qty: 240 TABLET | Refills: 3 | OUTPATIENT

## 2025-03-07 RX ORDER — AMITRIPTYLINE HYDROCHLORIDE 10 MG/1
TAKE 1 TABLET BY MOUTH EVERYDAY AT BEDTIME TABLET, FILM COATED ORAL
Qty: 90 TABLETS | Refills: 3 | Status: ACTIVE | COMMUNITY

## 2025-03-07 RX ORDER — UPADACITINIB 15 MG/1
1 TABLET TABLET, EXTENDED RELEASE ORAL ONCE A DAY
Qty: 90 TABLET | Refills: 3 | OUTPATIENT

## 2025-03-07 RX ORDER — USTEKINUMAB 90 MG/ML
PRE-FILLED SYRINGE INJECTION, SOLUTION SUBCUTANEOUS
Qty: 1 | Refills: 11 | Status: ACTIVE | COMMUNITY

## 2025-03-07 RX ORDER — PREDNISONE 20 MG/1
TAKE 1 TABLET EVERY DAY TABLET ORAL
Qty: 90 TABLET | Refills: 3 | Status: ACTIVE | COMMUNITY

## 2025-03-07 RX ORDER — COLCHICINE 0.6 MG/1
1 CAPSULE CAPSULE ORAL
Status: ACTIVE | COMMUNITY

## 2025-03-07 RX ORDER — LOSARTAN POTASSIUM AND HYDROCHLOROTHIAZIDE 100; 12.5 MG/1; MG/1
TAKE 1 TABLET BY ORAL ROUTE ONCE DAILY TABLET, FILM COATED ORAL 1
Qty: 0 | Refills: 0 | Status: ACTIVE | COMMUNITY
Start: 1900-01-01

## 2025-03-07 RX ORDER — UPADACITINIB 45 MG/1
AS DIRECTED TABLET, EXTENDED RELEASE ORAL ONCE A DAY
Qty: 84 | Refills: 0 | Status: ACTIVE | COMMUNITY
Start: 2024-12-13 | End: 2025-03-07

## 2025-03-07 RX ORDER — UPADACITINIB 45 MG/1
AS DIRECTED TABLET, EXTENDED RELEASE ORAL ONCE A DAY
Qty: 84 | Refills: 0 | OUTPATIENT

## 2025-03-07 RX ORDER — UPADACITINIB 15 MG/1
1 TABLET TABLET, EXTENDED RELEASE ORAL ONCE A DAY
Qty: 90 TABLET | Refills: 3 | Status: ACTIVE | COMMUNITY
Start: 2024-12-13 | End: 2025-12-08

## 2025-03-07 RX ORDER — COLESTIPOL HYDROCHLORIDE 1 G/1
TAKE 2 TABLETS BY MOUTH EVERY DAY FOR 30 DAYS TABLET, FILM COATED ORAL
Qty: 60 TABLET | Refills: 6 | Status: ACTIVE | COMMUNITY

## 2025-03-07 NOTE — HPI-TODAY'S VISIT:
12/13/24  Ms. 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  She has been on Humira and most recently Stelara.  She hasn't noticed any change in symptoms on stelara. she is on injections q 8 weeks. at last visit, cdiff pcr +, but toxin negative and elevated calpro. tx with vanc.  still having diarrhea. repeat cdiff positive. tx with dificid. on stelara. feels like it isn't helping. uses imodium 1-2x weekly. sb  2/27/24: Ms. Espitia returns for follow-up of nonspecific ileitis and colitis, possibly Crohn's versus UC. she had a flex sig, which seemed more consistent with crohns. she was given pred course and placed on sulfasalazine. unfortunately, this stopped controlling her symptoms. she is now on stelara and continues having loose stools in am. sb 4/27/23 Flex sig with erosions asced, desced, and transverse- path crohns vs UC 1/2022 Normal colonoscopy with normal random bx.  Colonoscopy 7/2020 with congested mucosa in the recto-sigmoid colon, path with questionable colitis, nonspecific.  EGD 1/29/20 normal EGD; gastric bx negative for H. pylori, esophageal bx c/w lymphocytic esophagitis (reflux related). Colonoscopy 12/2016-normal colon; single ulcer in the distal ileum with bx focally ulcerated TI, no granulomas. 3/7/2025 Umm is a pleasant 74-year-old female who returns for follow-up of Crohn's.  She said that initially Rinvoq helped her complaints, but now her loose stools have returned.  She takes Imodium once daily with relief.  Symptoms are usually in the AM.  There seems to be a question of whether or not she was excepted to patient assistance with [employer].  denies bleeding sb

## 2025-03-10 LAB
A/G RATIO: 1.6
ABSOLUTE BASOPHILS: 18
ABSOLUTE EOSINOPHILS: 18
ABSOLUTE LYMPHOCYTES: 3121
ABSOLUTE MONOCYTES: 372
ABSOLUTE NEUTROPHILS: 2372
ALBUMIN: 4
ALKALINE PHOSPHATASE: 25
ALT (SGPT): 21
AST (SGOT): 24
BASOPHILS: 0.3
BILIRUBIN, TOTAL: 0.5
BUN/CREATININE RATIO: 20
BUN: 20
C-REACTIVE PROTEIN, QUANT: <3
CALCIUM: 9.6
CARBON DIOXIDE, TOTAL: 29
CHLORIDE: 104
CHOL/HDLC RATIO: 3.1
CHOLESTEROL, TOTAL: 232
CREATININE: 1.02
EGFR: 57
EOSINOPHILS: 0.3
GLOBULIN, TOTAL: 2.5
GLUCOSE: 81
HDL CHOLESTEROL: 74
HEMATOCRIT: 31
HEMOGLOBIN: 10.3
LDL-CHOLESTEROL: 137
LYMPHOCYTES: 52.9
MCH: 30.9
MCHC: 33.2
MCV: 93.1
MONOCYTES: 6.3
MPV: 11.7
NEUTROPHILS: 40.2
NON HDL CHOLESTEROL: 158
PLATELET COUNT: 161
POTASSIUM: 3.8
PROTEIN, TOTAL: 6.5
RDW: 15.3
RED BLOOD CELL COUNT: 3.33
SODIUM: 142
TRIGLYCERIDES: 107
WHITE BLOOD CELL COUNT: 5.9

## 2025-03-11 ENCOUNTER — TELEPHONE ENCOUNTER (OUTPATIENT)
Dept: URBAN - NONMETROPOLITAN AREA CLINIC 2 | Facility: CLINIC | Age: 75
End: 2025-03-11

## 2025-03-15 LAB
CALPROTECTIN, FECAL: 47
CLOSTRIDIUM DIFFICILE TOXINB,QL REAL TIME PCR: DETECTED
CLOSTRIDIUM DIFFICILE: (no result)

## 2025-03-17 ENCOUNTER — TELEPHONE ENCOUNTER (OUTPATIENT)
Dept: URBAN - NONMETROPOLITAN AREA CLINIC 2 | Facility: CLINIC | Age: 75
End: 2025-03-17

## 2025-03-17 RX ORDER — FIDAXOMICIN 200 MG/1
1 TABLET TABLET, FILM COATED ORAL TWICE A DAY
Qty: 20 | OUTPATIENT
Start: 2025-03-17 | End: 2025-03-27

## 2025-03-18 ENCOUNTER — TELEPHONE ENCOUNTER (OUTPATIENT)
Dept: URBAN - NONMETROPOLITAN AREA CLINIC 2 | Facility: CLINIC | Age: 75
End: 2025-03-18

## 2025-03-25 ENCOUNTER — TELEPHONE ENCOUNTER (OUTPATIENT)
Dept: URBAN - NONMETROPOLITAN AREA CLINIC 2 | Facility: CLINIC | Age: 75
End: 2025-03-25

## 2025-03-25 RX ORDER — PRAMOXINE HYDROCHLORIDE 10 MG/G
1 APPLICATION AEROSOL, FOAM TOPICAL TWICE A DAY
Qty: 1 | Refills: 3 | OUTPATIENT
Start: 2025-03-26 | End: 2025-05-21

## 2025-03-26 ENCOUNTER — TELEPHONE ENCOUNTER (OUTPATIENT)
Dept: URBAN - NONMETROPOLITAN AREA CLINIC 2 | Facility: CLINIC | Age: 75
End: 2025-03-26

## 2025-03-26 RX ORDER — PRAMOXINE HYDROCHLORIDE 10 MG/G
1 APPLICATION AEROSOL, FOAM TOPICAL TWICE A DAY
Qty: 1 | Refills: 3
Start: 2025-03-26 | End: 2025-05-21

## 2025-03-27 ENCOUNTER — TELEPHONE ENCOUNTER (OUTPATIENT)
Dept: URBAN - NONMETROPOLITAN AREA CLINIC 2 | Facility: CLINIC | Age: 75
End: 2025-03-27

## 2025-05-21 ENCOUNTER — OFFICE VISIT (OUTPATIENT)
Dept: URBAN - NONMETROPOLITAN AREA CLINIC 2 | Facility: CLINIC | Age: 75
End: 2025-05-21
Payer: MEDICARE

## 2025-05-21 DIAGNOSIS — R19.5 LOOSE STOOLS: ICD-10-CM

## 2025-05-21 DIAGNOSIS — Z12.11 COLON CANCER SCREENING: ICD-10-CM

## 2025-05-21 DIAGNOSIS — K50.10 CROHN'S DISEASE OF LARGE INTESTINE WITHOUT COMPLICATION: ICD-10-CM

## 2025-05-21 DIAGNOSIS — A04.72 C. DIFFICILE DIARRHEA: ICD-10-CM

## 2025-05-21 PROCEDURE — 99214 OFFICE O/P EST MOD 30 MIN: CPT | Performed by: INTERNAL MEDICINE

## 2025-05-21 RX ORDER — LOPERAMIDE HCL 2 MG/1
1 TABLET AS NEEDED TABLET, FILM COATED ORAL
Qty: 240 TABLET | Refills: 3 | OUTPATIENT
Start: 2025-05-21 | End: 2026-01-16

## 2025-05-21 RX ORDER — PRAMOXINE HYDROCHLORIDE 10 MG/G
1 APPLICATION AEROSOL, FOAM TOPICAL TWICE A DAY
Qty: 1 | Refills: 3 | Status: ACTIVE | COMMUNITY
Start: 2025-03-26 | End: 2025-05-21

## 2025-05-21 RX ORDER — AMITRIPTYLINE HYDROCHLORIDE 10 MG/1
TAKE 1 TABLET BY MOUTH EVERYDAY AT BEDTIME TABLET, FILM COATED ORAL
Qty: 90 TABLETS | Refills: 3 | Status: ACTIVE | COMMUNITY

## 2025-05-21 RX ORDER — COLESTIPOL HYDROCHLORIDE 1 G/1
TAKE 2 TABLETS BY MOUTH EVERY DAY FOR 30 DAYS TABLET, FILM COATED ORAL
Qty: 60 TABLET | Refills: 6 | Status: ACTIVE | COMMUNITY

## 2025-05-21 RX ORDER — KRILL/OM-3/DHA/EPA/PHOSPHO/AST 1000-230MG
1 TABLET CAPSULE ORAL ONCE A DAY
Status: ACTIVE | COMMUNITY

## 2025-05-21 RX ORDER — LOSARTAN POTASSIUM AND HYDROCHLOROTHIAZIDE 100; 12.5 MG/1; MG/1
TAKE 1 TABLET BY ORAL ROUTE ONCE DAILY TABLET, FILM COATED ORAL 1
Qty: 0 | Refills: 0 | Status: ACTIVE | COMMUNITY
Start: 1900-01-01

## 2025-05-21 RX ORDER — LOPERAMIDE HCL 2 MG/1
1 TABLET AS NEEDED TABLET, FILM COATED ORAL
Qty: 240 TABLET | Refills: 3 | Status: ACTIVE | COMMUNITY

## 2025-05-21 RX ORDER — COLCHICINE 0.6 MG/1
1 CAPSULE CAPSULE ORAL
Status: ACTIVE | COMMUNITY

## 2025-05-21 RX ORDER — PANTOPRAZOLE SODIUM 40 MG/1
1 TABLET TABLET, DELAYED RELEASE ORAL ONCE A DAY
Status: ACTIVE | COMMUNITY

## 2025-05-21 RX ORDER — LEVOTHYROXINE SODIUM 25 UG/1
TABLET ORAL
Qty: 0 | Refills: 0 | Status: ACTIVE | COMMUNITY
Start: 1900-01-01

## 2025-05-21 RX ORDER — PREDNISONE 20 MG/1
TAKE 1 TABLET EVERY DAY TABLET ORAL
Qty: 90 TABLET | Refills: 3 | Status: ACTIVE | COMMUNITY

## 2025-05-21 RX ORDER — HYOSCYAMINE SULFATE 0.38 MG/1
TAKE 1 TABLET (0.375 MG) BY ORAL ROUTE EVERY 12 HOURS FOR 30 DAYS TABLET, EXTENDED RELEASE ORAL
Qty: 60 TABLETS | Refills: 5 | Status: ACTIVE | COMMUNITY

## 2025-05-21 RX ORDER — BUDESONIDE 2 MG/1
1 APPLICATION AEROSOL, FOAM RECTAL TWICE A DAY
Qty: 33.4 | Refills: 11 | Status: ACTIVE | COMMUNITY
Start: 2024-02-27

## 2025-05-21 RX ORDER — UPADACITINIB 15 MG/1
1 TABLET TABLET, EXTENDED RELEASE ORAL ONCE A DAY
Qty: 90 TABLET | Refills: 3 | Status: ACTIVE | COMMUNITY

## 2025-05-21 RX ORDER — UPADACITINIB 45 MG/1
AS DIRECTED TABLET, EXTENDED RELEASE ORAL ONCE A DAY
Qty: 84 | Refills: 0 | Status: ACTIVE | COMMUNITY

## 2025-05-21 RX ORDER — USTEKINUMAB 90 MG/ML
PRE-FILLED SYRINGE INJECTION, SOLUTION SUBCUTANEOUS
Qty: 1 | Refills: 11 | Status: ACTIVE | COMMUNITY

## 2025-05-21 RX ORDER — CHOLESTYRAMINE POWDER FOR SUSPENSION 4 G/8.78G
TAKE 1 SCOOP ORALLY EVERY DAY FOR 30 DAYS POWDER, FOR SUSPENSION ORAL
Qty: 30 PACKET | Refills: 6 | Status: ACTIVE | COMMUNITY

## 2025-05-21 NOTE — HPI-TODAY'S VISIT:
12/13/24  Ms. 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  She has been on Humira and most recently Stelara.  She hasn't noticed any change in symptoms on stelara. she is on injections q 8 weeks. at last visit, cdiff pcr +, but toxin negative and elevated calpro. tx with vanc.  still having diarrhea. repeat cdiff positive. tx with dificid. on stelara. feels like it isn't helping. uses imodium 1-2x weekly. sb  2/27/24: Ms. Espitia returns for follow-up of nonspecific ileitis and colitis, possibly Crohn's versus UC. she had a flex sig, which seemed more consistent with crohns. she was given pred course and placed on sulfasalazine. unfortunately, this stopped controlling her symptoms. she is now on stelara and continues having loose stools in am. sb  4/27/23 Flex sig with erosions asced, desced, and transverse- path crohns vs UC 1/2022 Normal colonoscopy with normal random bx.  Colonoscopy 7/2020 with congested mucosa in the recto-sigmoid colon, path with questionable colitis, nonspecific.   EGD 1/29/20 normal EGD; gastric bx negative for H. pylori, esophageal bx c/w lymphocytic esophagitis (reflux related). Colonoscopy 12/2016-normal colon; single ulcer in the distal ileum with bx focally ulcerated TI, no granulomas.  3/7/2025: Umm is a pleasant 74-year-old female who returns for follow-up of Crohn's.  She said that initially Rinvoq helped her complaints, but now her loose stools have returned.  She takes Imodium once daily with relief.  Symptoms are usually in the AM.  There seems to be a question of whether or not she was excepted to patient assistance with [employer].  denies bleeding sb  5/21/25: Ms. Espitia returns to GI clinic for follow-up of ileocolonic Crohn's disease.  She was diagnosed with C-diff diarrhea again in March.  She reports that she is doing well, very little diarrhea.  She is unsure if she took antibiotics but reports her stools are formed and no abdominal pain.  She is using Loperamide PRN.  She is no longer taking Rinvoq as it was not helping her loose stools.

## 2025-08-27 ENCOUNTER — OFFICE VISIT (OUTPATIENT)
Dept: URBAN - NONMETROPOLITAN AREA CLINIC 2 | Facility: CLINIC | Age: 75
End: 2025-08-27
Payer: MEDICARE

## 2025-08-27 ENCOUNTER — LAB OUTSIDE AN ENCOUNTER (OUTPATIENT)
Dept: URBAN - NONMETROPOLITAN AREA CLINIC 2 | Facility: CLINIC | Age: 75
End: 2025-08-27

## 2025-08-27 DIAGNOSIS — A04.72 C. DIFFICILE DIARRHEA: ICD-10-CM

## 2025-08-27 DIAGNOSIS — R19.5 LOOSE STOOLS: ICD-10-CM

## 2025-08-27 DIAGNOSIS — Z12.11 COLON CANCER SCREENING: ICD-10-CM

## 2025-08-27 DIAGNOSIS — K50.10 CROHN'S DISEASE OF LARGE INTESTINE WITHOUT COMPLICATION: ICD-10-CM

## 2025-08-27 PROCEDURE — 99214 OFFICE O/P EST MOD 30 MIN: CPT | Performed by: NURSE PRACTITIONER

## 2025-08-27 RX ORDER — KRILL/OM-3/DHA/EPA/PHOSPHO/AST 1000-230MG
1 TABLET CAPSULE ORAL ONCE A DAY
Status: ACTIVE | COMMUNITY

## 2025-08-27 RX ORDER — UPADACITINIB 45 MG/1
AS DIRECTED TABLET, EXTENDED RELEASE ORAL ONCE A DAY
Qty: 84 | Refills: 0 | Status: ACTIVE | COMMUNITY

## 2025-08-27 RX ORDER — COLESTIPOL HYDROCHLORIDE 1 G/1
TAKE 2 TABLETS BY MOUTH EVERY DAY FOR 30 DAYS TABLET, FILM COATED ORAL
Qty: 60 TABLET | Refills: 6 | Status: ACTIVE | COMMUNITY

## 2025-08-27 RX ORDER — LOPERAMIDE HCL 2 MG/1
1 TABLET AS NEEDED TABLET, FILM COATED ORAL
Qty: 240 TABLET | Refills: 3 | OUTPATIENT
Start: 2025-08-27 | End: 2026-04-24

## 2025-08-27 RX ORDER — LEVOTHYROXINE SODIUM 0.03 MG/1
TABLET ORAL
Qty: 0 | Refills: 0 | Status: ACTIVE | COMMUNITY
Start: 1900-01-01

## 2025-08-27 RX ORDER — LOSARTAN POTASSIUM AND HYDROCHLOROTHIAZIDE 100; 12.5 MG/1; MG/1
TAKE 1 TABLET BY ORAL ROUTE ONCE DAILY TABLET, FILM COATED ORAL 1
Qty: 0 | Refills: 0 | Status: ACTIVE | COMMUNITY
Start: 1900-01-01

## 2025-08-27 RX ORDER — UPADACITINIB 15 MG/1
1 TABLET TABLET, EXTENDED RELEASE ORAL ONCE A DAY
Qty: 90 TABLET | Refills: 3 | Status: ACTIVE | COMMUNITY

## 2025-08-27 RX ORDER — CHOLESTYRAMINE POWDER FOR SUSPENSION 4 G/8.78G
TAKE 1 SCOOP ORALLY EVERY DAY FOR 30 DAYS POWDER, FOR SUSPENSION ORAL
Qty: 30 PACKET | Refills: 6 | Status: ACTIVE | COMMUNITY

## 2025-08-27 RX ORDER — COLCHICINE 0.6 MG/1
1 CAPSULE CAPSULE ORAL
Status: ACTIVE | COMMUNITY

## 2025-08-27 RX ORDER — HYOSCYAMINE SULFATE 0.38 MG/1
TAKE 1 TABLET (0.375 MG) BY ORAL ROUTE EVERY 12 HOURS FOR 30 DAYS TABLET, EXTENDED RELEASE ORAL
Qty: 60 TABLETS | Refills: 5 | Status: ACTIVE | COMMUNITY

## 2025-08-27 RX ORDER — LOPERAMIDE HCL 2 MG/1
1 TABLET AS NEEDED TABLET, FILM COATED ORAL
Qty: 240 TABLET | Refills: 3 | Status: ACTIVE | COMMUNITY
Start: 2025-05-21 | End: 2026-01-16

## 2025-08-27 RX ORDER — PREDNISONE 20 MG/1
TAKE 1 TABLET EVERY DAY TABLET ORAL
Qty: 90 TABLET | Refills: 3 | Status: ACTIVE | COMMUNITY

## 2025-08-27 RX ORDER — BUDESONIDE 2 MG/1
1 APPLICATION AEROSOL, FOAM RECTAL TWICE A DAY
Qty: 33.4 | Refills: 11 | Status: ACTIVE | COMMUNITY
Start: 2024-02-27

## 2025-08-27 RX ORDER — AMITRIPTYLINE HYDROCHLORIDE 10 MG/1
TAKE 1 TABLET BY MOUTH EVERYDAY AT BEDTIME TABLET, FILM COATED ORAL
Qty: 90 TABLETS | Refills: 3 | Status: ACTIVE | COMMUNITY

## 2025-08-27 RX ORDER — PANTOPRAZOLE SODIUM 40 MG/1
1 TABLET TABLET, DELAYED RELEASE ORAL ONCE A DAY
Status: ACTIVE | COMMUNITY

## 2025-08-27 RX ORDER — USTEKINUMAB 90 MG/ML
PRE-FILLED SYRINGE INJECTION, SOLUTION SUBCUTANEOUS
Qty: 1 | Refills: 11 | Status: ACTIVE | COMMUNITY

## 2025-08-29 ENCOUNTER — TELEPHONE ENCOUNTER (OUTPATIENT)
Dept: URBAN - NONMETROPOLITAN AREA CLINIC 2 | Facility: CLINIC | Age: 75
End: 2025-08-29

## 2025-08-29 LAB
ADENOVIRUS F 40/41: NOT DETECTED
C. DIFFICILE TOXIN A/B, STOOL - QDX: NEGATIVE
CALPROTECTIN, STOOL - QDX: (no result)
CAMPYLOBACTER: NOT DETECTED
CLOSTRIDIUM DIFFICILE: DETECTED
ENTAMOEBA HISTOLYTICA: NOT DETECTED
ENTEROAGGREGATIVE E.COLI: NOT DETECTED
ENTEROTOXIGENIC E.COLI: NOT DETECTED
ESCHERICHIA COLI O157: NOT DETECTED
GIARDIA LAMBLIA: NOT DETECTED
NOROVIRUS GI/GII: NOT DETECTED
ROTAVIRUS A: NOT DETECTED
SALMONELLA SPP.: NOT DETECTED
SHIGA-LIKE TOXIN PRODUCING E.COLI: NOT DETECTED
SHIGELLA SPP. / ENTEROINVASIVE E.COLI: NOT DETECTED
VIBRIO PARAHAEMOLYTICUS: NOT DETECTED
VIBRIO SPP.: NOT DETECTED
YERSINIA ENTEROCOLITICA: NOT DETECTED